# Patient Record
Sex: FEMALE | Race: WHITE | NOT HISPANIC OR LATINO | URBAN - METROPOLITAN AREA
[De-identification: names, ages, dates, MRNs, and addresses within clinical notes are randomized per-mention and may not be internally consistent; named-entity substitution may affect disease eponyms.]

---

## 2020-06-08 ENCOUNTER — OFFICE VISIT (OUTPATIENT)
Dept: URBAN - METROPOLITAN AREA CLINIC 113 | Facility: CLINIC | Age: 50
End: 2020-06-08

## 2020-07-25 ENCOUNTER — TELEPHONE ENCOUNTER (OUTPATIENT)
Dept: URBAN - METROPOLITAN AREA CLINIC 13 | Facility: CLINIC | Age: 50
End: 2020-07-25

## 2020-07-26 ENCOUNTER — TELEPHONE ENCOUNTER (OUTPATIENT)
Dept: URBAN - METROPOLITAN AREA CLINIC 13 | Facility: CLINIC | Age: 50
End: 2020-07-26

## 2020-07-26 RX ORDER — DESVENLAFAXINE SUCCINATE 50 MG/1
TAKE 1 TABLET DAILY TABLET, EXTENDED RELEASE ORAL
Refills: 0 | Status: ACTIVE | COMMUNITY
Start: 2018-10-24

## 2020-07-26 RX ORDER — METFORMIN HYDROCHLORIDE 500 MG/1
TAKE ONE TABLET TWICE DAILY WITH MEALS TABLET, COATED ORAL
Qty: 60 | Refills: 0 | Status: ACTIVE | COMMUNITY
Start: 2018-10-24

## 2020-07-26 RX ORDER — NORTRIPTYLINE HYDROCHLORIDE 25 MG/1
TAKE ONE CAPSULE BY MOUTH EVERY NIGHT AT BEDTIME CAPSULE ORAL
Qty: 30 | Refills: 0 | Status: ACTIVE | COMMUNITY
Start: 2018-05-09

## 2020-07-26 RX ORDER — METOPROLOL SUCCINATE 50 MG/1
TAKE 2 TABLETS DAILY TABLET, EXTENDED RELEASE ORAL
Refills: 0 | Status: ACTIVE | COMMUNITY
Start: 2018-05-07

## 2020-07-26 RX ORDER — VORTIOXETINE 10 MG/1
TAKE ONE TABLET DAILY TABLET, FILM COATED ORAL
Qty: 30 | Refills: 0 | Status: ACTIVE | COMMUNITY
Start: 2018-07-24

## 2020-07-26 RX ORDER — CLONAZEPAM 0.5 MG/1
TAKE ONE TABLET TWICE DAILY AS NEEDED TABLET ORAL
Qty: 60 | Refills: 0 | Status: ACTIVE | COMMUNITY
Start: 2018-10-25

## 2020-07-26 RX ORDER — DEXLANSOPRAZOLE 60 MG/1
TAKE ONE CAPSULE EVERY DAY CAPSULE, DELAYED RELEASE ORAL
Qty: 30 | Refills: 0 | Status: ACTIVE | COMMUNITY
Start: 2018-02-13

## 2020-07-26 RX ORDER — BUTALBITAL, ACETAMINOPHEN, AND CAFFEINE 50; 325; 40 MG/1; MG/1; MG/1
TAKE ONE TABLET TWICE DAILY FOR HEADACHE TABLET ORAL
Qty: 60 | Refills: 0 | Status: ACTIVE | COMMUNITY
Start: 2018-05-15

## 2020-07-26 RX ORDER — TRAZODONE HYDROCHLORIDE 150 MG/1
TAKE TWO TABLETS AT BEDTIME TABLET ORAL
Qty: 60 | Refills: 0 | Status: ACTIVE | COMMUNITY
Start: 2018-02-13

## 2020-07-26 RX ORDER — ALENDRONATE SODIUM 70 MG/1
TAKE ONE TABLET ONCE A WEEK TABLET ORAL
Qty: 4 | Refills: 0 | Status: ACTIVE | COMMUNITY
Start: 2018-09-11

## 2020-07-26 RX ORDER — VORTIOXETINE 20 MG/1
TAKE 1 TABLET DAILY TABLET, FILM COATED ORAL
Qty: 30 | Refills: 0 | Status: ACTIVE | COMMUNITY
Start: 2018-08-20

## 2020-07-26 RX ORDER — FUROSEMIDE 20 MG/1
TAKE 1 TABLET DAILY TABLET ORAL
Refills: 0 | Status: ACTIVE | COMMUNITY

## 2020-07-26 RX ORDER — AMOXICILLIN AND CLAVULANATE POTASSIUM 875; 125 MG/1; MG/1
TAKE ONE TABLET EVERY TWELVE HOURS TABLET, FILM COATED ORAL
Qty: 20 | Refills: 0 | Status: ACTIVE | COMMUNITY
Start: 2018-07-24

## 2020-07-26 RX ORDER — NEOMYCIN SULFATE, POLYMYXIN B SULFATE AND HYDROCORTISONE 10; 3.5; 1 MG/ML; MG/ML; [USP'U]/ML
INSTILL FOUR DROPS IN AFFECTED EAR(S) TWICE DAILY SUSPENSION/ DROPS AURICULAR (OTIC)
Qty: 10 | Refills: 0 | Status: ACTIVE | COMMUNITY
Start: 2018-10-24

## 2020-07-26 RX ORDER — FLUTICASONE FUROATE AND VILANTEROL TRIFENATATE 100; 25 UG/1; UG/1
USE 1 INHALATION ONCE DAILY POWDER RESPIRATORY (INHALATION)
Refills: 0 | Status: ACTIVE | COMMUNITY

## 2020-07-26 RX ORDER — VERAPAMIL HYDROCHLORIDE 120 MG/1
TAKE ONE TABLET EVERY DAY TABLET, FILM COATED, EXTENDED RELEASE ORAL
Qty: 30 | Refills: 0 | Status: ACTIVE | COMMUNITY
Start: 2017-09-15

## 2020-07-26 RX ORDER — MONTELUKAST SODIUM 10 MG/1
TAKE ONE TABLET DAILY IN THE EVENING TABLET, FILM COATED ORAL
Qty: 30 | Refills: 0 | Status: ACTIVE | COMMUNITY
Start: 2018-10-24

## 2020-07-26 RX ORDER — METFORMIN HYDROCHLORIDE 1000 MG/1
TAKE 1 TABLET BY MOUTH DAILY TABLET, EXTENDED RELEASE ORAL
Qty: 30 | Refills: 0 | Status: ACTIVE | COMMUNITY
Start: 2018-02-08

## 2020-07-30 ENCOUNTER — OFFICE VISIT (OUTPATIENT)
Dept: URBAN - METROPOLITAN AREA CLINIC 113 | Facility: CLINIC | Age: 50
End: 2020-07-30

## 2020-08-27 ENCOUNTER — OFFICE VISIT (OUTPATIENT)
Dept: URBAN - METROPOLITAN AREA CLINIC 113 | Facility: CLINIC | Age: 50
End: 2020-08-27

## 2020-10-06 ENCOUNTER — OFFICE VISIT (OUTPATIENT)
Dept: URBAN - METROPOLITAN AREA CLINIC 113 | Facility: CLINIC | Age: 50
End: 2020-10-06

## 2020-11-20 ENCOUNTER — OFFICE VISIT (OUTPATIENT)
Dept: URBAN - METROPOLITAN AREA CLINIC 113 | Facility: CLINIC | Age: 50
End: 2020-11-20
Payer: COMMERCIAL

## 2020-11-20 VITALS
SYSTOLIC BLOOD PRESSURE: 154 MMHG | WEIGHT: 218 LBS | DIASTOLIC BLOOD PRESSURE: 94 MMHG | TEMPERATURE: 98.1 F | RESPIRATION RATE: 18 BRPM | HEART RATE: 101 BPM | BODY MASS INDEX: 40.12 KG/M2 | HEIGHT: 62 IN

## 2020-11-20 DIAGNOSIS — R19.7 DIARRHEA OF PRESUMED INFECTIOUS ORIGIN: ICD-10-CM

## 2020-11-20 DIAGNOSIS — K62.5 RECTAL BLEEDING: ICD-10-CM

## 2020-11-20 DIAGNOSIS — K52.9 COLITIS: ICD-10-CM

## 2020-11-20 PROCEDURE — G8482 FLU IMMUNIZE ORDER/ADMIN: HCPCS | Performed by: INTERNAL MEDICINE

## 2020-11-20 PROCEDURE — G9710 PT PROV HOSP SRV MSMT PER: HCPCS | Performed by: INTERNAL MEDICINE

## 2020-11-20 PROCEDURE — 99213 OFFICE O/P EST LOW 20 MIN: CPT | Performed by: INTERNAL MEDICINE

## 2020-11-20 PROCEDURE — G9906 PT RECV TBCO CESS INTERV: HCPCS | Performed by: INTERNAL MEDICINE

## 2020-11-20 PROCEDURE — G9902 PT SCRN TBCO AND ID AS USER: HCPCS | Performed by: INTERNAL MEDICINE

## 2020-11-20 PROCEDURE — 4004F PT TOBACCO SCREEN RCVD TLK: CPT | Performed by: INTERNAL MEDICINE

## 2020-11-20 PROCEDURE — 1036F TOBACCO NON-USER: CPT | Performed by: INTERNAL MEDICINE

## 2020-11-20 PROCEDURE — G9903 PT SCRN TBCO ID AS NON USER: HCPCS | Performed by: INTERNAL MEDICINE

## 2020-11-20 RX ORDER — SODIUM, POTASSIUM,MAG SULFATES 17.5-3.13G
354 ML SOLUTION, RECONSTITUTED, ORAL ORAL ONCE
Qty: 354 ML | Refills: 0 | OUTPATIENT
Start: 2020-11-20

## 2020-11-20 RX ORDER — SEMAGLUTIDE 1.34 MG/ML
AS DIRECTED INJECTION, SOLUTION SUBCUTANEOUS
Status: ACTIVE | COMMUNITY

## 2020-11-20 RX ORDER — CARVEDILOL 25 MG/1
1 TABLET WITH FOOD TABLET, FILM COATED ORAL TWICE A DAY
Status: ACTIVE | COMMUNITY

## 2020-11-20 RX ORDER — BUTALBITAL, ACETAMINOPHEN, AND CAFFEINE 50; 325; 40 MG/1; MG/1; MG/1
TAKE ONE TABLET TWICE DAILY FOR HEADACHE TABLET ORAL
Qty: 60 | Refills: 0 | Status: DISCONTINUED | COMMUNITY
Start: 2018-05-15

## 2020-11-20 RX ORDER — TRAZODONE HYDROCHLORIDE 150 MG/1
TAKE TWO TABLETS AT BEDTIME TABLET ORAL
Qty: 60 | Refills: 0 | Status: ACTIVE | COMMUNITY
Start: 2018-02-13

## 2020-11-20 RX ORDER — NEOMYCIN SULFATE, POLYMYXIN B SULFATE AND HYDROCORTISONE 10; 3.5; 1 MG/ML; MG/ML; [USP'U]/ML
INSTILL FOUR DROPS IN AFFECTED EAR(S) TWICE DAILY SUSPENSION/ DROPS AURICULAR (OTIC)
Qty: 10 | Refills: 0 | Status: DISCONTINUED | COMMUNITY
Start: 2018-10-24

## 2020-11-20 RX ORDER — MONTELUKAST SODIUM 10 MG/1
TAKE ONE TABLET DAILY IN THE EVENING TABLET, FILM COATED ORAL
Qty: 30 | Refills: 0 | Status: ACTIVE | COMMUNITY
Start: 2018-10-24

## 2020-11-20 RX ORDER — FUROSEMIDE 20 MG/1
TAKE 1 TABLET DAILY TABLET ORAL
Refills: 0 | Status: ACTIVE | COMMUNITY

## 2020-11-20 RX ORDER — CLONAZEPAM 0.5 MG/1
TAKE ONE TABLET TWICE DAILY AS NEEDED TABLET ORAL
Qty: 60 | Refills: 0 | Status: ACTIVE | COMMUNITY
Start: 2018-10-25

## 2020-11-20 RX ORDER — METOPROLOL SUCCINATE 50 MG/1
TAKE 2 TABLETS DAILY TABLET, EXTENDED RELEASE ORAL
Refills: 0 | Status: DISCONTINUED | COMMUNITY
Start: 2018-05-07

## 2020-11-20 RX ORDER — VORTIOXETINE 10 MG/1
TAKE ONE TABLET DAILY TABLET, FILM COATED ORAL
Qty: 30 | Refills: 0 | Status: DISCONTINUED | COMMUNITY
Start: 2018-07-24

## 2020-11-20 RX ORDER — VERAPAMIL HYDROCHLORIDE 120 MG/1
TAKE ONE TABLET EVERY DAY TABLET, FILM COATED, EXTENDED RELEASE ORAL
Qty: 30 | Refills: 0 | Status: ACTIVE | COMMUNITY
Start: 2017-09-15

## 2020-11-20 RX ORDER — VORTIOXETINE 20 MG/1
TAKE 1 TABLET DAILY TABLET, FILM COATED ORAL
Qty: 30 | Refills: 0 | Status: DISCONTINUED | COMMUNITY
Start: 2018-08-20

## 2020-11-20 RX ORDER — METFORMIN HYDROCHLORIDE 1000 MG/1
TAKE 1 TABLET BY MOUTH DAILY TABLET, EXTENDED RELEASE ORAL
Qty: 30 | Refills: 0 | Status: DISCONTINUED | COMMUNITY
Start: 2018-02-08

## 2020-11-20 RX ORDER — AMOXICILLIN AND CLAVULANATE POTASSIUM 875; 125 MG/1; MG/1
TAKE ONE TABLET EVERY TWELVE HOURS TABLET, FILM COATED ORAL
Qty: 20 | Refills: 0 | Status: DISCONTINUED | COMMUNITY
Start: 2018-07-24

## 2020-11-20 RX ORDER — DEXLANSOPRAZOLE 60 MG/1
TAKE ONE CAPSULE EVERY DAY CAPSULE, DELAYED RELEASE ORAL
Qty: 30 | Refills: 0 | Status: ACTIVE | COMMUNITY
Start: 2018-02-13

## 2020-11-20 RX ORDER — NORTRIPTYLINE HYDROCHLORIDE 25 MG/1
TAKE ONE CAPSULE BY MOUTH EVERY NIGHT AT BEDTIME CAPSULE ORAL
Qty: 30 | Refills: 0 | Status: ACTIVE | COMMUNITY
Start: 2018-05-09

## 2020-11-20 RX ORDER — DESVENLAFAXINE SUCCINATE 50 MG/1
TAKE 1 TABLET DAILY TABLET, EXTENDED RELEASE ORAL
Refills: 0 | Status: ACTIVE | COMMUNITY
Start: 2018-10-24

## 2020-11-20 RX ORDER — METFORMIN HYDROCHLORIDE 500 MG/1
TAKE ONE TABLET TWICE DAILY WITH MEALS TABLET, COATED ORAL
Qty: 60 | Refills: 0 | Status: DISCONTINUED | COMMUNITY
Start: 2018-10-24

## 2020-11-20 RX ORDER — ALENDRONATE SODIUM 70 MG/1
TAKE ONE TABLET ONCE A WEEK TABLET ORAL
Qty: 4 | Refills: 0 | Status: ACTIVE | COMMUNITY
Start: 2018-09-11

## 2020-11-20 RX ORDER — FLUTICASONE FUROATE AND VILANTEROL TRIFENATATE 100; 25 UG/1; UG/1
USE 1 INHALATION ONCE DAILY POWDER RESPIRATORY (INHALATION)
Refills: 0 | Status: ACTIVE | COMMUNITY

## 2020-11-20 NOTE — HPI-TODAY'S VISIT:
51 y/o female presenting for f/u. She has a hx of elevated liver enzymes. She has had incrased abdominal pain on both sides of her abdomen. She has had a CT scan performed which showed colitis. She also has rectal bleeding and mucus. She also has intermittent diarrhea. She also has intermittent constipation as well. She does have a family hx of colon cancer as well. ==================== 10/10/2019 Ms. Duarte is a 49-year-old female with a history of diabetes, nausea, hyperlipidemia, sleep apnea, Sjogren's syndrome, hypothyroidism, blindness in the right, anxiety, depression, and liver enzyme elevation presenting for follow-up. She was initially seen 6/24/19 referred for liver enzyme elevation. She reported elevated liver enzymes for 1-2 years. She denied alcohol use. She reported chronic GERD for which she was taking Dexilant. She had breakthrough heartburn twice a week and reported a few month history of solid food dysphagia. She reported chronic right upper quadrant pain. She reported nausea associated with migraines. She reported chronic alternating bowel habits. She had a colonoscopy 2 years prior during which polyps were removed and reported an EGD 10 years prior. Labs ordered to assess for sources of chronic liver disease and she was scheduled for a CT to assess for sources of right upper quadrant pain and to assess liver parenchyma. She was scheduled for an EGD to assess breakthrough reflux and dysphagia. Daily fiber was recommended for chronic alternating bowel habits. EGD 8/13/19: No endoscopic esophageal abnormality to explain dysphagia status post biopsy, gastritis status post biopsy, normal examined duodenum. Stomach body and antral biopsies demonstrated gastric mucosa with reactive foveolar hyperplasia, stromal fibrosis and focal mixed inflammation. No H. pylori. Esophageal biopsies demonstrated squamous mucosa with no diagnostic abnormalities. CT scan was not performed due to respiratory problems associated with IV diet administration. Labs 7/9/19 ceruloplasmin 35. Hepatitis C antibody, hepatitis A antibody total, hepatitis B surface antibody, hepatitis B surface antigen nonreactive. Serum immunoglobulins: IgG 821, IgA 378, IgM 191, IgE 63. LKM1 negative. WALE, ASMA, WALE negative. Alpha-1 antitrypsin phenotype MM. CBC: WBC 7.3, hemoglobin 15.2, MCV 89.5, platelets 202. Iron 122, TIBC 351, ferritin of 132. She reports unchanged pain in the right and left sides of her abdomen. She tried taking daily fiber without a change in bowel habits. She has a bowel movement 2-5 times per day. Her stools are occasionally hard and are occasionally loose. She denies red blood per rectum or melena. She is compliant with Dexilant for acid reflux. She reports uncontrolled blood glucose levels stating that her blood glucose levels are "good if they are not higher than 250."

## 2020-11-24 ENCOUNTER — CLAIMS CREATED FROM THE CLAIM WINDOW (OUTPATIENT)
Dept: URBAN - METROPOLITAN AREA CLINIC 4 | Facility: CLINIC | Age: 50
End: 2020-11-24
Payer: COMMERCIAL

## 2020-11-24 ENCOUNTER — OFFICE VISIT (OUTPATIENT)
Dept: URBAN - METROPOLITAN AREA SURGERY CENTER 25 | Facility: SURGERY CENTER | Age: 50
End: 2020-11-24
Payer: COMMERCIAL

## 2020-11-24 DIAGNOSIS — K52.9 CHRONIC DIARRHEA: ICD-10-CM

## 2020-11-24 DIAGNOSIS — K63.89 OTHER SPECIFIED DISEASES OF INTESTINE: ICD-10-CM

## 2020-11-24 DIAGNOSIS — D12.4 ADENOMA OF DESCENDING COLON: ICD-10-CM

## 2020-11-24 DIAGNOSIS — D12.4 BENIGN NEOPLASM OF DESCENDING COLON: ICD-10-CM

## 2020-11-24 PROCEDURE — 45385 COLONOSCOPY W/LESION REMOVAL: CPT | Performed by: INTERNAL MEDICINE

## 2020-11-24 PROCEDURE — 88305 TISSUE EXAM BY PATHOLOGIST: CPT | Performed by: PATHOLOGY

## 2020-11-24 PROCEDURE — G8907 PT DOC NO EVENTS ON DISCHARG: HCPCS | Performed by: INTERNAL MEDICINE

## 2020-11-24 PROCEDURE — G9937 DIG OR SURV COLSCO: HCPCS | Performed by: INTERNAL MEDICINE

## 2020-11-24 PROCEDURE — 88342 IMHCHEM/IMCYTCHM 1ST ANTB: CPT | Performed by: PATHOLOGY

## 2020-11-24 PROCEDURE — 45380 COLONOSCOPY AND BIOPSY: CPT | Performed by: INTERNAL MEDICINE

## 2020-11-24 PROCEDURE — 88313 SPECIAL STAINS GROUP 2: CPT | Performed by: PATHOLOGY

## 2020-11-24 RX ORDER — DESVENLAFAXINE SUCCINATE 50 MG/1
TAKE 1 TABLET DAILY TABLET, EXTENDED RELEASE ORAL
Refills: 0 | Status: ACTIVE | COMMUNITY
Start: 2018-10-24

## 2020-11-24 RX ORDER — TRAZODONE HYDROCHLORIDE 150 MG/1
TAKE TWO TABLETS AT BEDTIME TABLET ORAL
Qty: 60 | Refills: 0 | Status: ACTIVE | COMMUNITY
Start: 2018-02-13

## 2020-11-24 RX ORDER — SEMAGLUTIDE 1.34 MG/ML
AS DIRECTED INJECTION, SOLUTION SUBCUTANEOUS
Status: ACTIVE | COMMUNITY

## 2020-11-24 RX ORDER — ALENDRONATE SODIUM 70 MG/1
TAKE ONE TABLET ONCE A WEEK TABLET ORAL
Qty: 4 | Refills: 0 | Status: ACTIVE | COMMUNITY
Start: 2018-09-11

## 2020-11-24 RX ORDER — FLUTICASONE FUROATE AND VILANTEROL TRIFENATATE 100; 25 UG/1; UG/1
USE 1 INHALATION ONCE DAILY POWDER RESPIRATORY (INHALATION)
Refills: 0 | Status: ACTIVE | COMMUNITY

## 2020-11-24 RX ORDER — SODIUM, POTASSIUM,MAG SULFATES 17.5-3.13G
354 ML SOLUTION, RECONSTITUTED, ORAL ORAL ONCE
Qty: 354 ML | Refills: 0 | Status: ACTIVE | COMMUNITY
Start: 2020-11-20

## 2020-11-24 RX ORDER — CARVEDILOL 25 MG/1
1 TABLET WITH FOOD TABLET, FILM COATED ORAL TWICE A DAY
Status: ACTIVE | COMMUNITY

## 2020-11-24 RX ORDER — DEXLANSOPRAZOLE 60 MG/1
TAKE ONE CAPSULE EVERY DAY CAPSULE, DELAYED RELEASE ORAL
Qty: 30 | Refills: 0 | Status: ACTIVE | COMMUNITY
Start: 2018-02-13

## 2020-11-24 RX ORDER — VERAPAMIL HYDROCHLORIDE 120 MG/1
TAKE ONE TABLET EVERY DAY TABLET, FILM COATED, EXTENDED RELEASE ORAL
Qty: 30 | Refills: 0 | Status: ACTIVE | COMMUNITY
Start: 2017-09-15

## 2020-11-24 RX ORDER — FUROSEMIDE 20 MG/1
TAKE 1 TABLET DAILY TABLET ORAL
Refills: 0 | Status: ACTIVE | COMMUNITY

## 2020-11-24 RX ORDER — CLONAZEPAM 0.5 MG/1
TAKE ONE TABLET TWICE DAILY AS NEEDED TABLET ORAL
Qty: 60 | Refills: 0 | Status: ACTIVE | COMMUNITY
Start: 2018-10-25

## 2020-11-24 RX ORDER — MONTELUKAST SODIUM 10 MG/1
TAKE ONE TABLET DAILY IN THE EVENING TABLET, FILM COATED ORAL
Qty: 30 | Refills: 0 | Status: ACTIVE | COMMUNITY
Start: 2018-10-24

## 2020-11-24 RX ORDER — NORTRIPTYLINE HYDROCHLORIDE 25 MG/1
TAKE ONE CAPSULE BY MOUTH EVERY NIGHT AT BEDTIME CAPSULE ORAL
Qty: 30 | Refills: 0 | Status: ACTIVE | COMMUNITY
Start: 2018-05-09

## 2020-12-29 ENCOUNTER — OFFICE VISIT (OUTPATIENT)
Dept: URBAN - METROPOLITAN AREA CLINIC 113 | Facility: CLINIC | Age: 50
End: 2020-12-29
Payer: COMMERCIAL

## 2020-12-29 ENCOUNTER — LAB OUTSIDE AN ENCOUNTER (OUTPATIENT)
Dept: URBAN - METROPOLITAN AREA CLINIC 113 | Facility: CLINIC | Age: 50
End: 2020-12-29

## 2020-12-29 VITALS
DIASTOLIC BLOOD PRESSURE: 86 MMHG | SYSTOLIC BLOOD PRESSURE: 161 MMHG | BODY MASS INDEX: 40.3 KG/M2 | RESPIRATION RATE: 18 BRPM | HEIGHT: 62 IN | TEMPERATURE: 97.8 F | WEIGHT: 219 LBS | HEART RATE: 97 BPM

## 2020-12-29 DIAGNOSIS — Z86.010 HISTORY OF ADENOMATOUS POLYP OF COLON: ICD-10-CM

## 2020-12-29 DIAGNOSIS — K21.9 GASTROESOPHAGEAL REFLUX DISEASE, UNSPECIFIED WHETHER ESOPHAGITIS PRESENT: ICD-10-CM

## 2020-12-29 DIAGNOSIS — K58.0 IRRITABLE BOWEL SYNDROME WITH DIARRHEA: ICD-10-CM

## 2020-12-29 DIAGNOSIS — K76.0 FATTY LIVER: ICD-10-CM

## 2020-12-29 PROCEDURE — G9903 PT SCRN TBCO ID AS NON USER: HCPCS | Performed by: INTERNAL MEDICINE

## 2020-12-29 PROCEDURE — 1036F TOBACCO NON-USER: CPT | Performed by: INTERNAL MEDICINE

## 2020-12-29 PROCEDURE — G8427 DOCREV CUR MEDS BY ELIG CLIN: HCPCS | Performed by: INTERNAL MEDICINE

## 2020-12-29 PROCEDURE — 3017F COLORECTAL CA SCREEN DOC REV: CPT | Performed by: INTERNAL MEDICINE

## 2020-12-29 PROCEDURE — 99213 OFFICE O/P EST LOW 20 MIN: CPT | Performed by: INTERNAL MEDICINE

## 2020-12-29 RX ORDER — MONTELUKAST SODIUM 10 MG/1
TAKE ONE TABLET DAILY IN THE EVENING TABLET, FILM COATED ORAL
Qty: 30 | Refills: 0 | Status: ACTIVE | COMMUNITY
Start: 2018-10-24

## 2020-12-29 RX ORDER — ALENDRONATE SODIUM 70 MG/1
TAKE ONE TABLET ONCE A WEEK TABLET ORAL
Qty: 4 | Refills: 0 | Status: ACTIVE | COMMUNITY
Start: 2018-09-11

## 2020-12-29 RX ORDER — ALBUTEROL SULFATE 108 UG/1
1 PUFF AS NEEDED AEROSOL, METERED RESPIRATORY (INHALATION)
Status: ACTIVE | COMMUNITY

## 2020-12-29 RX ORDER — CARVEDILOL 25 MG/1
1 TABLET WITH FOOD TABLET, FILM COATED ORAL TWICE A DAY
Status: ACTIVE | COMMUNITY

## 2020-12-29 RX ORDER — GLUCOSAMINE/CHONDR SU A SOD 750-600 MG
1 CAPSULE TABLET ORAL ONCE A DAY
Status: ACTIVE | COMMUNITY

## 2020-12-29 RX ORDER — FLUTICASONE FUROATE AND VILANTEROL TRIFENATATE 100; 25 UG/1; UG/1
USE 1 INHALATION ONCE DAILY POWDER RESPIRATORY (INHALATION)
Refills: 0 | Status: ACTIVE | COMMUNITY

## 2020-12-29 RX ORDER — INSULIN DEGLUDEC INJECTION 100 U/ML
AS DIRECTED INJECTION, SOLUTION SUBCUTANEOUS
Status: ACTIVE | COMMUNITY

## 2020-12-29 RX ORDER — TRAZODONE HYDROCHLORIDE 150 MG/1
TAKE TWO TABLETS AT BEDTIME TABLET ORAL
Qty: 60 | Refills: 0 | Status: ACTIVE | COMMUNITY
Start: 2018-02-13

## 2020-12-29 RX ORDER — DICYCLOMINE HYDROCHLORIDE 10 MG/1
1 TABLET CAPSULE ORAL
Qty: 60 | Refills: 3 | OUTPATIENT

## 2020-12-29 RX ORDER — LATANOPROST 50 UG/ML
1 DROP INTO AFFECTED EYE IN THE EVENING SOLUTION/ DROPS OPHTHALMIC ONCE A DAY
Status: ACTIVE | COMMUNITY

## 2020-12-29 RX ORDER — FUROSEMIDE 20 MG/1
1 TABLET TABLET ORAL ONCE A DAY
Status: ACTIVE | COMMUNITY

## 2020-12-29 RX ORDER — DEXLANSOPRAZOLE 60 MG/1
TAKE ONE CAPSULE EVERY DAY CAPSULE, DELAYED RELEASE ORAL
Qty: 30 | Refills: 0 | Status: ACTIVE | COMMUNITY
Start: 2018-02-13

## 2020-12-29 RX ORDER — NITROFURANTOIN MONOHYDRATE/MACROCRYSTALLINE 25; 75 MG/1; MG/1
1 CAPFUL CAPSULE ORAL TWICE A DAY
Status: ACTIVE | COMMUNITY

## 2020-12-29 RX ORDER — NORTRIPTYLINE HYDROCHLORIDE 25 MG/1
TAKE ONE CAPSULE BY MOUTH EVERY NIGHT AT BEDTIME CAPSULE ORAL
Qty: 30 | Refills: 0 | Status: ACTIVE | COMMUNITY
Start: 2018-05-09

## 2020-12-29 RX ORDER — MECLIZINE HCL 25MG 25 MG/1
1 TABLET AS NEEDED TABLET, CHEWABLE ORAL ONCE A DAY
Status: ACTIVE | COMMUNITY

## 2020-12-29 RX ORDER — CLONAZEPAM 0.5 MG/1
TAKE ONE TABLET TWICE DAILY AS NEEDED TABLET ORAL
Qty: 60 | Refills: 0 | Status: ACTIVE | COMMUNITY
Start: 2018-10-25

## 2020-12-29 RX ORDER — SODIUM, POTASSIUM,MAG SULFATES 17.5-3.13G
354 ML SOLUTION, RECONSTITUTED, ORAL ORAL ONCE
Qty: 354 MILLILITER | Refills: 0 | OUTPATIENT

## 2020-12-29 RX ORDER — COLESEVELAM HYDROCHLORIDE 625 MG/1
1 TABLET TABLET, COATED ORAL TWICE A DAY
Qty: 60 | Refills: 4 | OUTPATIENT

## 2020-12-29 RX ORDER — PNV NO.95/FERROUS FUM/FOLIC AC 28MG-0.8MG
1 TABLET TABLET ORAL ONCE A DAY
Status: ACTIVE | COMMUNITY

## 2020-12-29 RX ORDER — DAPAGLIFLOZIN 5 MG/1
1 TABLET TABLET, FILM COATED ORAL ONCE A DAY
Status: ACTIVE | COMMUNITY

## 2020-12-29 NOTE — HPI-OTHER HISTORIES
Colonoscopy 11/24/2020: BBPS 9, grade 2 nonbleeding internal hemorrhoids, removal of an 8 mm rectal sessile hyperplastic polyp, removal of a 12 mm descending sessile tubular adenoma, normal mucosa in the entire colon status post biopsy.  Random biopsies demonstrated no significant abnormality.  Surveillance recommended in 2023.  CT of the abdomen and pelvis without contrast 10/20/2020 : Mild bowel wall thickening of the ascending and transverse colon is suspicious for a mild acute colitis.  Approximate 2.6 x 2.2 cm cyst within the superior spleen with peripheral calcification likely in the setting of prior trauma and/or splenic cyst.    Labs 9/22/2020: BMP normal with the exception of glucose 298, chloride 99.  LFTs: TB 0.8, , ALT 69, AST 66.  Cholesterol 286, triglycerides 233, .  Vitamin B12 321.  Hemoglobin A1c 9.9.  Vitamin D 25 hydroxy 23.  Folic acid 17.  Free T3 3.97.  CBC: WBC 6.2, hemoglobin 15.3, MCV 92.1, platelet 185.  TSH 1.07.  Free T4 1.0.  WALE negative.  EGD 8/13/19: No endoscopic abnormality to explain dysphagia status post biopsy, gastritis status post biopsy, normal examined duodenum. Antral biopsies notable for gastric mucosa showing reactive foveolar hyperplasia, stromal fibrosis, and focal mixed inflammation which can be seen in tissue adjacent to an erosion.  No H. pylori organisms identified.  GE junction biopsies notable for squamous mucosa with mild reactive epithelial changes.  No glandular epithelium identified.  Labs 7/9/19 ceruloplasmin 35. Hepatitis C antibody, hepatitis A antibody total, hepatitis B surface antibody, hepatitis B surface antigen nonreactive. Serum immunoglobulins: IgG 821, IgA 378, IgM 191, IgE 63. LKM1 negative. WALE, ASMA, WALE negative. Alpha-1 antitrypsin phenotype MM. CBC: WBC 7.3, hemoglobin 15.2, MCV 89.5, platelets 202. Iron 122, TIBC 351, ferritin of 132.  Colonoscopy 5/11/15:good prep, removal of a 6 mm transverse tubular adenoma, removal of a 3 mm descending tubular adenoma, nonbleeding internal hemorrhoids. Random biopsies demonstrated unremarkable colonic mucosa.

## 2020-12-29 NOTE — HPI-TODAY'S VISIT:
This is a 49-year-old female with a history of diabetes, hyperlipidemia, sleep apnea, Sjogren's syndrome, hypothyroidism, blindness in the right eye, anxiety/depression, liver enzyme elevation, rectal bleeding, elevated liver enzymes related to fatty liver, diarrhea, and a CT scan notable for colitis presenting for follow-up after a diagnostic colonoscopy.  She was last seen 11/20/2020.  She was complaining of worsening abdominal pain, intermittent diarrhea and constipation, rectal bleeding, and had a recent CT scan that demonstrated colitis.  She was scheduled for a colonoscopy and a stool for C. difficile was ordered.  Colonscopy results below.  She reports persistent, multiple bowel movements per day.  Stool consistency ranges from solid to loose.  Stool frequency may be as often as 4 times a day.  She reports occasional urgent bowel movements and consistent urgency to defecate after eating at restaurants.  Her symptoms were worse on fiber.  She denies red blood per rectum or melena.  She has chronic pain indicating the right and left lateral abdomen occurring frequently.  Severity waxes and wanes.  Occasionally, this will improve after a bowel movement.  She reports some diet dependent exacerbations such as after eating spicy food.  Heartburn is controlled with Dexilant.  She has frequent, almost constant nausea that is worse with migraines.  She vomits once or twice a week producing bile.  She denies any other abdominal symptoms.  She has identified worsening diarrhea and abdominal symptoms associated with ingesting dairy products suggesting lactose intolerance.  She is on magnesium once a day for a history of magnesium deficiency.

## 2021-01-01 PROBLEM — 43240000: Status: RESOLVED | Noted: 2020-11-20 | Resolved: 2021-01-01

## 2021-04-06 ENCOUNTER — OFFICE VISIT (OUTPATIENT)
Dept: URBAN - METROPOLITAN AREA CLINIC 113 | Facility: CLINIC | Age: 51
End: 2021-04-06

## 2021-04-08 ENCOUNTER — OFFICE VISIT (OUTPATIENT)
Dept: URBAN - METROPOLITAN AREA CLINIC 113 | Facility: CLINIC | Age: 51
End: 2021-04-08

## 2021-06-03 ENCOUNTER — OFFICE VISIT (OUTPATIENT)
Dept: URBAN - METROPOLITAN AREA CLINIC 113 | Facility: CLINIC | Age: 51
End: 2021-06-03

## 2021-08-21 ENCOUNTER — ERX REFILL RESPONSE (OUTPATIENT)
Dept: URBAN - METROPOLITAN AREA CLINIC 113 | Facility: CLINIC | Age: 51
End: 2021-08-21

## 2021-08-21 RX ORDER — DICYCLOMINE HYDROCHLORIDE 10 MG/1
TAKE ONE CAPSULE BY MOUTH EVERY 6 HOURS AS NEEDED FOR ABDOMINAL PAIN CAPSULE ORAL
Qty: 60 CAPSULE | Refills: 3 | OUTPATIENT

## 2021-08-21 RX ORDER — DICYCLOMINE HYDROCHLORIDE 10 MG/1
1 TABLET CAPSULE ORAL
Qty: 60 | Refills: 3 | OUTPATIENT

## 2021-09-26 ENCOUNTER — ERX REFILL RESPONSE (OUTPATIENT)
Dept: URBAN - METROPOLITAN AREA CLINIC 113 | Facility: CLINIC | Age: 51
End: 2021-09-26

## 2021-09-26 RX ORDER — COLESEVELAM HYDROCHLORIDE 625 MG/1
1 TABLET TABLET, COATED ORAL TWICE A DAY
Qty: 60 | Refills: 4 | OUTPATIENT

## 2021-09-26 RX ORDER — COLESEVELAM HYDROCHLORIDE 625 MG/1
TAKE 1 TABLET BY MOUTH TWICE DAILY TABLET, FILM COATED ORAL
Qty: 60 TABLET | Refills: 4 | OUTPATIENT

## 2021-09-30 ENCOUNTER — OFFICE VISIT (OUTPATIENT)
Dept: URBAN - METROPOLITAN AREA CLINIC 113 | Facility: CLINIC | Age: 51
End: 2021-09-30

## 2021-12-15 ENCOUNTER — OFFICE VISIT (OUTPATIENT)
Dept: URBAN - METROPOLITAN AREA CLINIC 113 | Facility: CLINIC | Age: 51
End: 2021-12-15
Payer: COMMERCIAL

## 2021-12-15 ENCOUNTER — LAB OUTSIDE AN ENCOUNTER (OUTPATIENT)
Dept: URBAN - METROPOLITAN AREA CLINIC 113 | Facility: CLINIC | Age: 51
End: 2021-12-15

## 2021-12-15 ENCOUNTER — WEB ENCOUNTER (OUTPATIENT)
Dept: URBAN - METROPOLITAN AREA CLINIC 113 | Facility: CLINIC | Age: 51
End: 2021-12-15

## 2021-12-15 VITALS
WEIGHT: 209 LBS | BODY MASS INDEX: 38.46 KG/M2 | HEART RATE: 105 BPM | SYSTOLIC BLOOD PRESSURE: 152 MMHG | DIASTOLIC BLOOD PRESSURE: 87 MMHG | RESPIRATION RATE: 18 BRPM | HEIGHT: 62 IN | TEMPERATURE: 98.1 F

## 2021-12-15 DIAGNOSIS — R10.84 GENERALIZED ABDOMINAL PAIN: ICD-10-CM

## 2021-12-15 DIAGNOSIS — K76.0 FATTY LIVER: ICD-10-CM

## 2021-12-15 DIAGNOSIS — R79.89 ELEVATED LFTS: ICD-10-CM

## 2021-12-15 DIAGNOSIS — K58.2 IRRITABLE BOWEL SYNDROME WITH BOTH CONSTIPATION AND DIARRHEA: ICD-10-CM

## 2021-12-15 PROCEDURE — 99214 OFFICE O/P EST MOD 30 MIN: CPT | Performed by: INTERNAL MEDICINE

## 2021-12-15 RX ORDER — PNV NO.95/FERROUS FUM/FOLIC AC 28MG-0.8MG
1 TABLET TABLET ORAL ONCE A DAY
Status: ACTIVE | COMMUNITY

## 2021-12-15 RX ORDER — NORTRIPTYLINE HYDROCHLORIDE 25 MG/1
TAKE ONE CAPSULE BY MOUTH EVERY NIGHT AT BEDTIME CAPSULE ORAL
Qty: 30 | Refills: 0 | Status: ACTIVE | COMMUNITY
Start: 2018-05-09

## 2021-12-15 RX ORDER — COLESEVELAM HYDROCHLORIDE 625 MG/1
TAKE 1 TABLET BY MOUTH TWICE DAILY TABLET, FILM COATED ORAL
Qty: 60 TABLET | Refills: 4 | Status: ACTIVE | COMMUNITY

## 2021-12-15 RX ORDER — CLONAZEPAM 0.5 MG/1
TAKE ONE TABLET TWICE DAILY AS NEEDED TABLET ORAL
Qty: 60 | Refills: 0 | Status: ACTIVE | COMMUNITY
Start: 2018-10-25

## 2021-12-15 RX ORDER — SODIUM, POTASSIUM,MAG SULFATES 17.5-3.13G
354 ML SOLUTION, RECONSTITUTED, ORAL ORAL ONCE
Qty: 354 MILLILITER | Refills: 0 | Status: DISCONTINUED | COMMUNITY

## 2021-12-15 RX ORDER — MILNACIPRAN HYDROCHLORIDE 25 MG/1
2 TABLETS TABLET, FILM COATED ORAL TWICE A DAY
Status: ACTIVE | COMMUNITY

## 2021-12-15 RX ORDER — DICYCLOMINE HYDROCHLORIDE 10 MG/1
TAKE ONE CAPSULE BY MOUTH EVERY 6 HOURS AS NEEDED FOR ABDOMINAL PAIN CAPSULE ORAL
Qty: 60 CAPSULE | Refills: 3 | Status: ACTIVE | COMMUNITY

## 2021-12-15 RX ORDER — ALBUTEROL SULFATE 108 UG/1
1 PUFF AS NEEDED AEROSOL, METERED RESPIRATORY (INHALATION)
Status: ACTIVE | COMMUNITY

## 2021-12-15 RX ORDER — TRAZODONE HYDROCHLORIDE 150 MG/1
TAKE TWO TABLETS AT BEDTIME TABLET ORAL
Qty: 60 | Refills: 0 | Status: ACTIVE | COMMUNITY
Start: 2018-02-13

## 2021-12-15 RX ORDER — ALENDRONATE SODIUM 70 MG/1
TAKE ONE TABLET ONCE A WEEK TABLET ORAL
Qty: 4 | Refills: 0 | Status: ACTIVE | COMMUNITY
Start: 2018-09-11

## 2021-12-15 RX ORDER — DEXLANSOPRAZOLE 60 MG/1
TAKE ONE CAPSULE EVERY DAY CAPSULE, DELAYED RELEASE ORAL
Qty: 30 | Refills: 0 | Status: ACTIVE | COMMUNITY
Start: 2018-02-13

## 2021-12-15 RX ORDER — MONTELUKAST SODIUM 10 MG/1
TAKE ONE TABLET DAILY IN THE EVENING TABLET, FILM COATED ORAL
Qty: 30 | Refills: 0 | Status: ACTIVE | COMMUNITY
Start: 2018-10-24

## 2021-12-15 RX ORDER — MECLIZINE HCL 25MG 25 MG/1
1 TABLET AS NEEDED TABLET, CHEWABLE ORAL ONCE A DAY
Status: ACTIVE | COMMUNITY

## 2021-12-15 RX ORDER — RIMEGEPANT SULFATE 75 MG/75MG
1 TABLET ON THE TONGUE AND ALLOW TO DISSOLVE TABLET, ORALLY DISINTEGRATING ORAL
Status: ACTIVE | COMMUNITY

## 2021-12-15 RX ORDER — UBROGEPANT 100 MG/1
1 TABLET MAY TAKE SECOND DOSE AT LEAST 2 HOURS AFTER FIRST DOSE AS NEEDED TABLET ORAL ONCE A DAY
Status: ACTIVE | COMMUNITY

## 2021-12-15 RX ORDER — FLUTICASONE FUROATE AND VILANTEROL TRIFENATATE 100; 25 UG/1; UG/1
USE 1 INHALATION ONCE DAILY POWDER RESPIRATORY (INHALATION)
Refills: 0 | Status: DISCONTINUED | COMMUNITY

## 2021-12-15 RX ORDER — LATANOPROST 50 UG/ML
1 DROP INTO AFFECTED EYE IN THE EVENING SOLUTION/ DROPS OPHTHALMIC ONCE A DAY
Status: ACTIVE | COMMUNITY

## 2021-12-15 RX ORDER — INSULIN DEGLUDEC INJECTION 100 U/ML
AS DIRECTED INJECTION, SOLUTION SUBCUTANEOUS
Status: ACTIVE | COMMUNITY

## 2021-12-15 RX ORDER — DAPAGLIFLOZIN 5 MG/1
1 TABLET TABLET, FILM COATED ORAL ONCE A DAY
Status: ACTIVE | COMMUNITY

## 2021-12-15 RX ORDER — CARVEDILOL 25 MG/1
1 TABLET WITH FOOD TABLET, FILM COATED ORAL TWICE A DAY
Status: ACTIVE | COMMUNITY

## 2021-12-15 RX ORDER — FUROSEMIDE 20 MG/1
1 TABLET TABLET ORAL ONCE A DAY
Status: ACTIVE | COMMUNITY

## 2021-12-15 RX ORDER — VERAPAMIL HYDROCHLORIDE 120 MG/1
1 TABLET TABLET ORAL ONCE A DAY
Status: ACTIVE | COMMUNITY

## 2021-12-15 RX ORDER — ALBUTEROL SULFATE 108 UG/1
1 PUFF AS NEEDED AEROSOL, METERED RESPIRATORY (INHALATION)
Status: DISCONTINUED | COMMUNITY

## 2021-12-15 RX ORDER — GLUCOSAMINE/CHONDR SU A SOD 750-600 MG
1 CAPSULE TABLET ORAL ONCE A DAY
Status: ACTIVE | COMMUNITY

## 2021-12-15 RX ORDER — NITROFURANTOIN MONOHYDRATE/MACROCRYSTALLINE 25; 75 MG/1; MG/1
1 CAPFUL CAPSULE ORAL TWICE A DAY
Status: DISCONTINUED | COMMUNITY

## 2021-12-15 NOTE — HPI-OTHER HISTORIES
Colonoscopy 11/24/2020: BBPS 9, grade 2 nonbleeding internal hemorrhoids, removal of an 8 mm rectal sessile hyperplastic polyp, removal of a 12 mm descending sessile tubular adenoma, normal mucosa in the entire colon status post biopsy.  Random biopsies demonstrated no significant abnormality.  Surveillance recommended in 2023.  CT of the abdomen and pelvis without contrast 10/20/2020 : Mild bowel wall thickening of the ascending and transverse colon is suspicious for a mild acute colitis.  Approximate 2.6 x 2.2 cm cyst within the superior spleen with peripheral calcification likely in the setting of prior trauma and/or splenic cyst.    Labs 9/22/2020: BMP normal with the exception of glucose 298, chloride 99.  LFTs: TB 0.8, , ALT 69, AST 66.  Cholesterol 286, triglycerides 233, .  Vitamin B12 321.  Hemoglobin A1c 9.9.  Vitamin D 25 hydroxy 23.  Folic acid 17.  Free T3 3.97.  CBC: WBC 6.2, hemoglobin 15.3, MCV 92.1, platelet 185.  TSH 1.07.  Free T4 1.0.  WALE negative.  EGD 8/13/19: No endoscopic abnormality to explain dysphagia status post biopsy, gastritis status post biopsy, normal examined duodenum. Antral biopsies notable for gastric mucosa showing reactive foveolar hyperplasia, stromal fibrosis, and focal mixed inflammation which can be seen in tissue adjacent to an erosion.  No H. pylori organisms identified.  GE junction biopsies notable for squamous mucosa with mild reactive epithelial changes.  No glandular epithelium identified.  Labs 7/9/19 ceruloplasmin 35. Hepatitis C antibody, hepatitis A antibody total, hepatitis B surface antibody, hepatitis B surface antigen nonreactive. Serum immunoglobulins: IgG 821, IgA 378, IgM 191, IgE 63. LKM1 negative. WALE, ASMA, WALE negative. Alpha-1 antitrypsin phenotype MM. CBC: WBC 7.3, hemoglobin 15.2, MCV 89.5, platelets 202. Iron 122, TIBC 351, ferritin of 132.  Colonoscopy 5/11/15:good prep, removal of a 6 mm transverse tubular adenoma, removal of a 3 mm descending tubular adenoma, nonbleeding internal hemorrhoids. Random biopsies demonstrated unremarkable colonic mucosa.
NIYA (acute kidney injury)

## 2021-12-15 NOTE — HPI-TODAY'S VISIT:
51-year-old female with a history of diabetes, hyperlipidemia, sleep apnea, Sjogren's syndrome, hypothyroidism, blindness in the right eye, anxiety/depression, liver enzyme elevation, presenting for follow-up.  She was last seen in December 2020 after a diagnostic colonoscopy was done to evaluate for colities seen on CT without contrast. Colonoscopy was unremarakable, results outlined below.   She continues to have GI symptoms. A month ago, she started having new onset constipation. Stool softeners did not help. It lasted for 3 weeks. She is having a bowel movement daily, no melena or hematochezia. She  has bilateral lower abdominal pain for years, overall better but not resolved. It is sometimes better after a bowel movement. She had recent labs done by Dr. Eid.   ==================== 10/10/2019 Ms. Duarte is a 49-year-old female with a history of diabetes, nausea, hyperlipidemia, sleep apnea, Sjogren's syndrome, hypothyroidism, blindness in the right, anxiety, depression, and liver enzyme elevation presenting for follow-up. She was initially seen 6/24/19 referred for liver enzyme elevation. She reported elevated liver enzymes for 1-2 years. She denied alcohol use. She reported chronic GERD for which she was taking Dexilant. She had breakthrough heartburn twice a week and reported a few month history of solid food dysphagia. She reported chronic right upper quadrant pain. She reported nausea associated with migraines. She reported chronic alternating bowel habits. She had a colonoscopy 2 years prior during which polyps were removed and reported an EGD 10 years prior. Labs ordered to assess for sources of chronic liver disease and she was scheduled for a CT to assess for sources of right upper quadrant pain and to assess liver parenchyma. She was scheduled for an EGD to assess breakthrough reflux and dysphagia. Daily fiber was recommended for chronic alternating bowel habits. EGD 8/13/19: No endoscopic esophageal abnormality to explain dysphagia status post biopsy, gastritis status post biopsy, normal examined duodenum. Stomach body and antral biopsies demonstrated gastric mucosa with reactive foveolar hyperplasia, stromal fibrosis and focal mixed inflammation. No H. pylori. Esophageal biopsies demonstrated squamous mucosa with no diagnostic abnormalities. CT scan was not performed due to respiratory problems associated with IV diet administration. Labs 7/9/19 ceruloplasmin 35. Hepatitis C antibody, hepatitis A antibody total, hepatitis B surface antibody, hepatitis B surface antigen nonreactive. Serum immunoglobulins: IgG 821, IgA 378, IgM 191, IgE 63. LKM1 negative. WALE, ASMA, WALE negative. Alpha-1 antitrypsin phenotype MM. CBC: WBC 7.3, hemoglobin 15.2, MCV 89.5, platelets 202. Iron 122, TIBC 351, ferritin of 132. She reports unchanged pain in the right and left sides of her abdomen. She tried taking daily fiber without a change in bowel habits. She has a bowel movement 2-5 times per day. Her stools are occasionally hard and are occasionally loose. She denies red blood per rectum or melena. She is compliant with Dexilant for acid reflux. She reports uncontrolled blood glucose levels stating that her blood glucose levels are "good if they are not higher than 250."

## 2021-12-15 NOTE — PHYSICAL EXAM GASTROINTESTINAL
Abdomen , soft, tenderness diffusely to minimal palpation, nondistended , no guarding or rigidity , no masses palpable , normal bowel sounds , Liver and Spleen , no hepatomegaly present , no hepatosplenomegaly , liver nontender , spleen not palpable

## 2021-12-22 ENCOUNTER — TELEPHONE ENCOUNTER (OUTPATIENT)
Dept: URBAN - METROPOLITAN AREA CLINIC 113 | Facility: CLINIC | Age: 51
End: 2021-12-22

## 2021-12-29 ENCOUNTER — TELEPHONE ENCOUNTER (OUTPATIENT)
Dept: URBAN - METROPOLITAN AREA CLINIC 113 | Facility: CLINIC | Age: 51
End: 2021-12-29

## 2022-01-27 ENCOUNTER — TELEPHONE ENCOUNTER (OUTPATIENT)
Dept: URBAN - METROPOLITAN AREA CLINIC 113 | Facility: CLINIC | Age: 52
End: 2022-01-27

## 2022-03-15 ENCOUNTER — OFFICE VISIT (OUTPATIENT)
Dept: URBAN - METROPOLITAN AREA CLINIC 113 | Facility: CLINIC | Age: 52
End: 2022-03-15

## 2022-03-31 ENCOUNTER — OFFICE VISIT (OUTPATIENT)
Dept: URBAN - METROPOLITAN AREA CLINIC 113 | Facility: CLINIC | Age: 52
End: 2022-03-31

## 2022-04-19 ENCOUNTER — ERX REFILL RESPONSE (OUTPATIENT)
Dept: URBAN - METROPOLITAN AREA CLINIC 113 | Facility: CLINIC | Age: 52
End: 2022-04-19

## 2022-04-19 RX ORDER — DICYCLOMINE HYDROCHLORIDE 10 MG/1
TAKE ONE CAPSULE BY MOUTH EVERY 6 HOURS AS NEEDED FOR ABDOMINAL PAIN CAPSULE ORAL
Qty: 60 CAPSULE | Refills: 3 | OUTPATIENT

## 2022-04-19 RX ORDER — DICYCLOMINE HYDROCHLORIDE 10 MG/1
TAKE ONE CAPSULE BY MOUTH EVERY 6 HOURS AS NEEDED FOR ABDOMINAL PAIN CAPSULE ORAL
Qty: 60 CAPSULE | Refills: 4 | OUTPATIENT

## 2022-05-11 ENCOUNTER — OFFICE VISIT (OUTPATIENT)
Dept: URBAN - METROPOLITAN AREA CLINIC 113 | Facility: CLINIC | Age: 52
End: 2022-05-11
Payer: COMMERCIAL

## 2022-05-11 VITALS
RESPIRATION RATE: 18 BRPM | WEIGHT: 207 LBS | HEART RATE: 106 BPM | DIASTOLIC BLOOD PRESSURE: 83 MMHG | BODY MASS INDEX: 38.09 KG/M2 | TEMPERATURE: 97.5 F | SYSTOLIC BLOOD PRESSURE: 149 MMHG | HEIGHT: 62 IN

## 2022-05-11 DIAGNOSIS — Z86.010 HISTORY OF ADENOMATOUS POLYP OF COLON: ICD-10-CM

## 2022-05-11 DIAGNOSIS — R79.89 ELEVATED LFTS: ICD-10-CM

## 2022-05-11 DIAGNOSIS — K58.0 IRRITABLE BOWEL SYNDROME WITH DIARRHEA: ICD-10-CM

## 2022-05-11 DIAGNOSIS — K58.2 IRRITABLE BOWEL SYNDROME WITH BOTH CONSTIPATION AND DIARRHEA: ICD-10-CM

## 2022-05-11 DIAGNOSIS — R74.8 ELEVATED LIVER ENZYMES: ICD-10-CM

## 2022-05-11 DIAGNOSIS — K76.0 FATTY LIVER: ICD-10-CM

## 2022-05-11 DIAGNOSIS — R10.11 RUQ ABDOMINAL PAIN: ICD-10-CM

## 2022-05-11 DIAGNOSIS — R10.84 GENERALIZED ABDOMINAL PAIN: ICD-10-CM

## 2022-05-11 DIAGNOSIS — K21.9 GASTROESOPHAGEAL REFLUX DISEASE, UNSPECIFIED WHETHER ESOPHAGITIS PRESENT: ICD-10-CM

## 2022-05-11 PROCEDURE — 99214 OFFICE O/P EST MOD 30 MIN: CPT | Performed by: INTERNAL MEDICINE

## 2022-05-11 RX ORDER — MECLIZINE HCL 25MG 25 MG/1
1 TABLET AS NEEDED TABLET, CHEWABLE ORAL ONCE A DAY
Status: ACTIVE | COMMUNITY

## 2022-05-11 RX ORDER — PNV NO.95/FERROUS FUM/FOLIC AC 28MG-0.8MG
1 TABLET TABLET ORAL ONCE A DAY
Status: ACTIVE | COMMUNITY

## 2022-05-11 RX ORDER — LATANOPROST 50 UG/ML
1 DROP INTO AFFECTED EYE IN THE EVENING SOLUTION/ DROPS OPHTHALMIC ONCE A DAY
Status: ACTIVE | COMMUNITY

## 2022-05-11 RX ORDER — FUROSEMIDE 20 MG/1
1 TABLET TABLET ORAL ONCE A DAY
Status: ACTIVE | COMMUNITY

## 2022-05-11 RX ORDER — NORTRIPTYLINE HYDROCHLORIDE 25 MG/1
TAKE ONE CAPSULE BY MOUTH EVERY NIGHT AT BEDTIME CAPSULE ORAL
Qty: 30 | Refills: 0 | Status: ACTIVE | COMMUNITY
Start: 2018-05-09

## 2022-05-11 RX ORDER — DEXLANSOPRAZOLE 60 MG/1
TAKE ONE CAPSULE EVERY DAY CAPSULE, DELAYED RELEASE ORAL
Qty: 30 | Refills: 0 | Status: ACTIVE | COMMUNITY
Start: 2018-02-13

## 2022-05-11 RX ORDER — DAPAGLIFLOZIN 5 MG/1
1 TABLET TABLET, FILM COATED ORAL ONCE A DAY
Status: ACTIVE | COMMUNITY

## 2022-05-11 RX ORDER — MONTELUKAST SODIUM 10 MG/1
TAKE ONE TABLET DAILY IN THE EVENING TABLET, FILM COATED ORAL
Qty: 30 | Refills: 0 | Status: ACTIVE | COMMUNITY
Start: 2018-10-24

## 2022-05-11 RX ORDER — CLONAZEPAM 0.5 MG/1
TAKE ONE TABLET TWICE DAILY AS NEEDED TABLET ORAL
Qty: 60 | Refills: 0 | Status: ACTIVE | COMMUNITY
Start: 2018-10-25

## 2022-05-11 RX ORDER — MILNACIPRAN HYDROCHLORIDE 25 MG/1
2 TABLETS TABLET, FILM COATED ORAL TWICE A DAY
Status: ACTIVE | COMMUNITY

## 2022-05-11 RX ORDER — DICYCLOMINE HYDROCHLORIDE 10 MG/1
TAKE ONE CAPSULE BY MOUTH EVERY 6 HOURS AS NEEDED FOR ABDOMINAL PAIN CAPSULE ORAL
Qty: 60 CAPSULE | Refills: 4 | Status: ACTIVE | COMMUNITY

## 2022-05-11 RX ORDER — COLESEVELAM HYDROCHLORIDE 625 MG/1
TAKE 1 TABLET BY MOUTH TWICE DAILY TABLET, FILM COATED ORAL
Qty: 60 TABLET | Refills: 4 | Status: ACTIVE | COMMUNITY

## 2022-05-11 RX ORDER — CARVEDILOL 25 MG/1
1 TABLET WITH FOOD TABLET, FILM COATED ORAL TWICE A DAY
Status: ACTIVE | COMMUNITY

## 2022-05-11 RX ORDER — BUSPIRONE HYDROCHLORIDE 5 MG/1
1 TABLET TABLET ORAL TWICE A DAY
Qty: 180 TABLET | Refills: 2 | OUTPATIENT
Start: 2022-05-11

## 2022-05-11 RX ORDER — RIMEGEPANT SULFATE 75 MG/75MG
1 TABLET ON THE TONGUE AND ALLOW TO DISSOLVE TABLET, ORALLY DISINTEGRATING ORAL
Status: ACTIVE | COMMUNITY

## 2022-05-11 RX ORDER — TRAZODONE HYDROCHLORIDE 150 MG/1
TAKE TWO TABLETS AT BEDTIME TABLET ORAL
Qty: 60 | Refills: 0 | Status: ACTIVE | COMMUNITY
Start: 2018-02-13

## 2022-05-11 RX ORDER — VERAPAMIL HYDROCHLORIDE 120 MG/1
1 TABLET TABLET ORAL ONCE A DAY
Status: ACTIVE | COMMUNITY

## 2022-05-11 RX ORDER — UBROGEPANT 100 MG/1
1 TABLET MAY TAKE SECOND DOSE AT LEAST 2 HOURS AFTER FIRST DOSE AS NEEDED TABLET ORAL ONCE A DAY
Status: ACTIVE | COMMUNITY

## 2022-05-11 RX ORDER — ALBUTEROL SULFATE 108 UG/1
1 PUFF AS NEEDED AEROSOL, METERED RESPIRATORY (INHALATION)
Status: ACTIVE | COMMUNITY

## 2022-05-11 RX ORDER — GLUCOSAMINE/CHONDR SU A SOD 750-600 MG
1 CAPSULE TABLET ORAL ONCE A DAY
Status: ACTIVE | COMMUNITY

## 2022-05-11 RX ORDER — INSULIN DEGLUDEC INJECTION 100 U/ML
AS DIRECTED INJECTION, SOLUTION SUBCUTANEOUS
Status: ACTIVE | COMMUNITY

## 2022-05-11 RX ORDER — ALENDRONATE SODIUM 70 MG/1
TAKE ONE TABLET ONCE A WEEK TABLET ORAL
Qty: 4 | Refills: 0 | Status: ACTIVE | COMMUNITY
Start: 2018-09-11

## 2022-05-11 NOTE — HPI-OTHER HISTORIES
Colonoscopy 11/24/2020: BBPS 9, grade 2 nonbleeding internal hemorrhoids, removal of an 8 mm rectal sessile hyperplastic polyp, removal of a 12 mm descending sessile tubular adenoma, normal mucosa in the entire colon status post biopsy.  Random biopsies demonstrated no significant abnormality.  Surveillance recommended in 2023.  CT of the abdomen and pelvis without contrast 10/20/2020 : Mild bowel wall thickening of the ascending and transverse colon is suspicious for a mild acute colitis.  Approximate 2.6 x 2.2 cm cyst within the superior spleen with peripheral calcification likely in the setting of prior trauma and/or splenic cyst.    Labs 9/22/2020: BMP normal with the exception of glucose 298, chloride 99.  LFTs: TB 0.8, , ALT 69, AST 66.  Cholesterol 286, triglycerides 233, .  Vitamin B12 321.  Hemoglobin A1c 9.9.  Vitamin D 25 hydroxy 23.  Folic acid 17.  Free T3 3.97.  CBC: WBC 6.2, hemoglobin 15.3, MCV 92.1, platelet 185.  TSH 1.07.  Free T4 1.0.  WALE negative.  EGD 8/13/19: No endoscopic abnormality to explain dysphagia status post biopsy, gastritis status post biopsy, normal examined duodenum. Antral biopsies notable for gastric mucosa showing reactive foveolar hyperplasia, stromal fibrosis, and focal mixed inflammation which can be seen in tissue adjacent to an erosion.  No H. pylori organisms identified.  GE junction biopsies notable for squamous mucosa with mild reactive epithelial changes.  No glandular epithelium identified.  Labs 7/9/19 ceruloplasmin 35. Hepatitis C antibody, hepatitis A antibody total, hepatitis B surface antibody, hepatitis B surface antigen nonreactive. Serum immunoglobulins: IgG 821, IgA 378, IgM 191, IgE 63. LKM1 negative. WALE, ASMA, WALE negative. Alpha-1 antitrypsin phenotype MM. CBC: WBC 7.3, hemoglobin 15.2, MCV 89.5, platelets 202. Iron 122, TIBC 351, ferritin of 132.  Colonoscopy 5/11/15:good prep, removal of a 6 mm transverse tubular adenoma, removal of a 3 mm descending tubular adenoma, nonbleeding internal hemorrhoids. Random biopsies demonstrated unremarkable colonic mucosa. Colonoscopy 11/24/2020: BBPS 9, grade 2 nonbleeding internal hemorrhoids, removal of an 8 mm rectal sessile hyperplastic polyp, removal of a 12 mm descending sessile tubular adenoma, normal mucosa in the entire colon status post biopsy.  Random biopsies demonstrated no significant abnormality.  Surveillance recommended in 2023.  CT of the abdomen and pelvis without contrast 10/20/2020 : Mild bowel wall thickening of the ascending and transverse colon is suspicious for a mild acute colitis.  Approximate 2.6 x 2.2 cm cyst within the superior spleen with peripheral calcification likely in the setting of prior trauma and/or splenic cyst.    Labs 9/22/2020: BMP normal with the exception of glucose 298, chloride 99.  LFTs: TB 0.8, , ALT 69, AST 66.  Cholesterol 286, triglycerides 233, .  Vitamin B12 321.  Hemoglobin A1c 9.9.  Vitamin D 25 hydroxy 23.  Folic acid 17.  Free T3 3.97.  CBC: WBC 6.2, hemoglobin 15.3, MCV 92.1, platelet 185.  TSH 1.07.  Free T4 1.0.  WALE negative.  EGD 8/13/19: No endoscopic abnormality to explain dysphagia status post biopsy, gastritis status post biopsy, normal examined duodenum. Antral biopsies notable for gastric mucosa showing reactive foveolar hyperplasia, stromal fibrosis, and focal mixed inflammation which can be seen in tissue adjacent to an erosion.  No H. pylori organisms identified.  GE junction biopsies notable for squamous mucosa with mild reactive epithelial changes.  No glandular epithelium identified.  Labs 7/9/19 ceruloplasmin 35. Hepatitis C antibody, hepatitis A antibody total, hepatitis B surface antibody, hepatitis B surface antigen nonreactive. Serum immunoglobulins: IgG 821, IgA 378, IgM 191, IgE 63. LKM1 negative. WALE, ASMA, WALE negative. Alpha-1 antitrypsin phenotype MM. CBC: WBC 7.3, hemoglobin 15.2, MCV 89.5, platelets 202. Iron 122, TIBC 351, ferritin of 132.  Colonoscopy 5/11/15:good prep, removal of a 6 mm transverse tubular adenoma, removal of a 3 mm descending tubular adenoma, nonbleeding internal hemorrhoids. Random biopsies demonstrated unremarkable colonic mucosa.

## 2022-05-11 NOTE — HPI-TODAY'S VISIT:
52-year-old female presenting for follow-up.  She was last seen in clinic in December 2021. We did try to get a CT scan last year but it was not approved. She has abdominal pain (generalized). She does have some constipation. Her pain is mostly RUQ/R flank. She has had increased abdominal pain when she is stressed.   12/2021 51-year-old female with a history of diabetes, hyperlipidemia, sleep apnea, Sjogren's syndrome, hypothyroidism, blindness in the right eye, anxiety/depression, liver enzyme elevation, presenting for follow-up.  She was last seen in December 2020 after a diagnostic colonoscopy was done to evaluate for colities seen on CT without contrast. Colonoscopy was unremarakable, results outlined below.   She continues to have GI symptoms. A month ago, she started having new onset constipation. Stool softeners did not help. It lasted for 3 weeks. She is having a bowel movement daily, no melena or hematochezia. She  has bilateral lower abdominal pain for years, overall better but not resolved. It is sometimes better after a bowel movement. She had recent labs done by Dr. Eid.   ==================== 10/10/2019 Ms. Duarte is a 49-year-old female with a history of diabetes, nausea, hyperlipidemia, sleep apnea, Sjogren's syndrome, hypothyroidism, blindness in the right, anxiety, depression, and liver enzyme elevation presenting for follow-up. She was initially seen 6/24/19 referred for liver enzyme elevation. She reported elevated liver enzymes for 1-2 years. She denied alcohol use. She reported chronic GERD for which she was taking Dexilant. She had breakthrough heartburn twice a week and reported a few month history of solid food dysphagia. She reported chronic right upper quadrant pain. She reported nausea associated with migraines. She reported chronic alternating bowel habits. She had a colonoscopy 2 years prior during which polyps were removed and reported an EGD 10 years prior. Labs ordered to assess for sources of chronic liver disease and she was scheduled for a CT to assess for sources of right upper quadrant pain and to assess liver parenchyma. She was scheduled for an EGD to assess breakthrough reflux and dysphagia. Daily fiber was recommended for chronic alternating bowel habits. EGD 8/13/19: No endoscopic esophageal abnormality to explain dysphagia status post biopsy, gastritis status post biopsy, normal examined duodenum. Stomach body and antral biopsies demonstrated gastric mucosa with reactive foveolar hyperplasia, stromal fibrosis and focal mixed inflammation. No H. pylori. Esophageal biopsies demonstrated squamous mucosa with no diagnostic abnormalities. CT scan was not performed due to respiratory problems associated with IV diet administration. Labs 7/9/19 ceruloplasmin 35. Hepatitis C antibody, hepatitis A antibody total, hepatitis B surface antibody, hepatitis B surface antigen nonreactive. Serum immunoglobulins: IgG 821, IgA 378, IgM 191, IgE 63. LKM1 negative. WALE, ASMA, WALE negative. Alpha-1 antitrypsin phenotype MM. CBC: WBC 7.3, hemoglobin 15.2, MCV 89.5, platelets 202. Iron 122, TIBC 351, ferritin of 132. She reports unchanged pain in the right and left sides of her abdomen. She tried taking daily fiber without a change in bowel habits. She has a bowel movement 2-5 times per day. Her stools are occasionally hard and are occasionally loose. She denies red blood per rectum or melena. She is compliant with Dexilant for acid reflux. She reports uncontrolled blood glucose levels stating that her blood glucose levels are "good if they are not higher than 250." This is a 49-year-old female with a history of diabetes, hyperlipidemia, sleep apnea, Sjogren's syndrome, hypothyroidism, blindness in the right eye, anxiety/depression, liver enzyme elevation, rectal bleeding, elevated liver enzymes related to fatty liver, diarrhea, and a CT scan notable for colitis presenting for follow-up after a diagnostic colonoscopy.  She was last seen 11/20/2020.  She was complaining of worsening abdominal pain, intermittent diarrhea and constipation, rectal bleeding, and had a recent CT scan that demonstrated colitis.  She was scheduled for a colonoscopy and a stool for C. difficile was ordered.  Colonscopy results below.  She reports persistent, multiple bowel movements per day.  Stool consistency ranges from solid to loose.  Stool frequency may be as often as 4 times a day.  She reports occasional urgent bowel movements and consistent urgency to defecate after eating at restaurants.  Her symptoms were worse on fiber.  She denies red blood per rectum or melena.  She has chronic pain indicating the right and left lateral abdomen occurring frequently.  Severity waxes and wanes.  Occasionally, this will improve after a bowel movement.  She reports some diet dependent exacerbations such as after eating spicy food.  Heartburn is controlled with Dexilant.  She has frequent, almost constant nausea that is worse with migraines.  She vomits once or twice a week producing bile.  She denies any other abdominal symptoms.  She has identified worsening diarrhea and abdominal symptoms associated with ingesting dairy products suggesting lactose intolerance.  She is on magnesium once a day for a history of magnesium deficiency.

## 2022-05-23 ENCOUNTER — TELEPHONE ENCOUNTER (OUTPATIENT)
Dept: URBAN - METROPOLITAN AREA CLINIC 113 | Facility: CLINIC | Age: 52
End: 2022-05-23

## 2022-06-14 ENCOUNTER — TELEPHONE ENCOUNTER (OUTPATIENT)
Dept: URBAN - METROPOLITAN AREA CLINIC 113 | Facility: CLINIC | Age: 52
End: 2022-06-14

## 2022-06-24 PROBLEM — 64226004: Status: RESOLVED | Noted: 2020-11-20 | Resolved: 2020-12-29

## 2022-06-24 PROBLEM — 12063002: Status: ACTIVE | Noted: 2020-11-20

## 2022-07-05 ENCOUNTER — TELEPHONE ENCOUNTER (OUTPATIENT)
Dept: URBAN - METROPOLITAN AREA CLINIC 113 | Facility: CLINIC | Age: 52
End: 2022-07-05

## 2022-07-06 ENCOUNTER — TELEPHONE ENCOUNTER (OUTPATIENT)
Dept: URBAN - METROPOLITAN AREA CLINIC 113 | Facility: CLINIC | Age: 52
End: 2022-07-06

## 2022-07-06 ENCOUNTER — OFFICE VISIT (OUTPATIENT)
Dept: URBAN - METROPOLITAN AREA SURGERY CENTER 25 | Facility: SURGERY CENTER | Age: 52
End: 2022-07-06

## 2022-07-07 ENCOUNTER — OFFICE VISIT (OUTPATIENT)
Dept: URBAN - METROPOLITAN AREA CLINIC 113 | Facility: CLINIC | Age: 52
End: 2022-07-07

## 2022-07-28 ENCOUNTER — OFFICE VISIT (OUTPATIENT)
Dept: URBAN - METROPOLITAN AREA CLINIC 113 | Facility: CLINIC | Age: 52
End: 2022-07-28

## 2022-08-04 ENCOUNTER — CLAIMS CREATED FROM THE CLAIM WINDOW (OUTPATIENT)
Dept: URBAN - METROPOLITAN AREA CLINIC 4 | Facility: CLINIC | Age: 52
End: 2022-08-04
Payer: COMMERCIAL

## 2022-08-04 ENCOUNTER — OFFICE VISIT (OUTPATIENT)
Dept: URBAN - METROPOLITAN AREA SURGERY CENTER 25 | Facility: SURGERY CENTER | Age: 52
End: 2022-08-04
Payer: COMMERCIAL

## 2022-08-04 DIAGNOSIS — K31.89 OTHER DISEASES OF STOMACH AND DUODENUM: ICD-10-CM

## 2022-08-04 DIAGNOSIS — K31.89 GASTRIC FOVEOLAR HYPERPLASIA: ICD-10-CM

## 2022-08-04 DIAGNOSIS — R10.13 ABDOMINAL PAIN, EPIGASTRIC: ICD-10-CM

## 2022-08-04 PROCEDURE — 88305 TISSUE EXAM BY PATHOLOGIST: CPT | Performed by: PATHOLOGY

## 2022-08-04 PROCEDURE — 43239 EGD BIOPSY SINGLE/MULTIPLE: CPT | Performed by: INTERNAL MEDICINE

## 2022-08-04 PROCEDURE — 88312 SPECIAL STAINS GROUP 1: CPT | Performed by: PATHOLOGY

## 2022-08-04 PROCEDURE — G8907 PT DOC NO EVENTS ON DISCHARG: HCPCS | Performed by: INTERNAL MEDICINE

## 2022-08-04 RX ORDER — MONTELUKAST SODIUM 10 MG/1
TAKE ONE TABLET DAILY IN THE EVENING TABLET, FILM COATED ORAL
Qty: 30 | Refills: 0 | Status: ACTIVE | COMMUNITY
Start: 2018-10-24

## 2022-08-04 RX ORDER — TRAZODONE HYDROCHLORIDE 150 MG/1
TAKE TWO TABLETS AT BEDTIME TABLET ORAL
Qty: 60 | Refills: 0 | Status: ACTIVE | COMMUNITY
Start: 2018-02-13

## 2022-08-04 RX ORDER — LATANOPROST 50 UG/ML
1 DROP INTO AFFECTED EYE IN THE EVENING SOLUTION/ DROPS OPHTHALMIC ONCE A DAY
Status: ACTIVE | COMMUNITY

## 2022-08-04 RX ORDER — CLONAZEPAM 0.5 MG/1
TAKE ONE TABLET TWICE DAILY AS NEEDED TABLET ORAL
Qty: 60 | Refills: 0 | Status: ACTIVE | COMMUNITY
Start: 2018-10-25

## 2022-08-04 RX ORDER — FUROSEMIDE 20 MG/1
1 TABLET TABLET ORAL ONCE A DAY
Status: ACTIVE | COMMUNITY

## 2022-08-04 RX ORDER — UBROGEPANT 100 MG/1
1 TABLET MAY TAKE SECOND DOSE AT LEAST 2 HOURS AFTER FIRST DOSE AS NEEDED TABLET ORAL ONCE A DAY
Status: ACTIVE | COMMUNITY

## 2022-08-04 RX ORDER — VERAPAMIL HYDROCHLORIDE 120 MG/1
1 TABLET TABLET ORAL ONCE A DAY
Status: ACTIVE | COMMUNITY

## 2022-08-04 RX ORDER — INSULIN DEGLUDEC INJECTION 100 U/ML
AS DIRECTED INJECTION, SOLUTION SUBCUTANEOUS
Status: ACTIVE | COMMUNITY

## 2022-08-04 RX ORDER — NORTRIPTYLINE HYDROCHLORIDE 25 MG/1
TAKE ONE CAPSULE BY MOUTH EVERY NIGHT AT BEDTIME CAPSULE ORAL
Qty: 30 | Refills: 0 | Status: ACTIVE | COMMUNITY
Start: 2018-05-09

## 2022-08-04 RX ORDER — ALBUTEROL SULFATE 108 UG/1
1 PUFF AS NEEDED AEROSOL, METERED RESPIRATORY (INHALATION)
Status: ACTIVE | COMMUNITY

## 2022-08-04 RX ORDER — PNV NO.95/FERROUS FUM/FOLIC AC 28MG-0.8MG
1 TABLET TABLET ORAL ONCE A DAY
Status: ACTIVE | COMMUNITY

## 2022-08-04 RX ORDER — COLESEVELAM HYDROCHLORIDE 625 MG/1
TAKE 1 TABLET BY MOUTH TWICE DAILY TABLET, FILM COATED ORAL
Qty: 60 TABLET | Refills: 4 | Status: ACTIVE | COMMUNITY

## 2022-08-04 RX ORDER — DAPAGLIFLOZIN 5 MG/1
1 TABLET TABLET, FILM COATED ORAL ONCE A DAY
Status: ACTIVE | COMMUNITY

## 2022-08-04 RX ORDER — RIMEGEPANT SULFATE 75 MG/75MG
1 TABLET ON THE TONGUE AND ALLOW TO DISSOLVE TABLET, ORALLY DISINTEGRATING ORAL
Status: ACTIVE | COMMUNITY

## 2022-08-04 RX ORDER — GLUCOSAMINE/CHONDR SU A SOD 750-600 MG
1 CAPSULE TABLET ORAL ONCE A DAY
Status: ACTIVE | COMMUNITY

## 2022-08-04 RX ORDER — CARVEDILOL 25 MG/1
1 TABLET WITH FOOD TABLET, FILM COATED ORAL TWICE A DAY
Status: ACTIVE | COMMUNITY

## 2022-08-04 RX ORDER — MECLIZINE HCL 25MG 25 MG/1
1 TABLET AS NEEDED TABLET, CHEWABLE ORAL ONCE A DAY
Status: ACTIVE | COMMUNITY

## 2022-08-04 RX ORDER — BUSPIRONE HYDROCHLORIDE 5 MG/1
1 TABLET TABLET ORAL TWICE A DAY
Qty: 180 TABLET | Refills: 2 | Status: ACTIVE | COMMUNITY
Start: 2022-05-11

## 2022-08-04 RX ORDER — DEXLANSOPRAZOLE 60 MG/1
TAKE ONE CAPSULE EVERY DAY CAPSULE, DELAYED RELEASE ORAL
Qty: 30 | Refills: 0 | Status: ACTIVE | COMMUNITY
Start: 2018-02-13

## 2022-08-04 RX ORDER — ALENDRONATE SODIUM 70 MG/1
TAKE ONE TABLET ONCE A WEEK TABLET ORAL
Qty: 4 | Refills: 0 | Status: ACTIVE | COMMUNITY
Start: 2018-09-11

## 2022-08-04 RX ORDER — MILNACIPRAN HYDROCHLORIDE 25 MG/1
2 TABLETS TABLET, FILM COATED ORAL TWICE A DAY
Status: ACTIVE | COMMUNITY

## 2022-08-04 RX ORDER — DICYCLOMINE HYDROCHLORIDE 10 MG/1
TAKE ONE CAPSULE BY MOUTH EVERY 6 HOURS AS NEEDED FOR ABDOMINAL PAIN CAPSULE ORAL
Qty: 60 CAPSULE | Refills: 4 | Status: ACTIVE | COMMUNITY

## 2022-08-16 ENCOUNTER — OFFICE VISIT (OUTPATIENT)
Dept: URBAN - METROPOLITAN AREA CLINIC 113 | Facility: CLINIC | Age: 52
End: 2022-08-16
Payer: COMMERCIAL

## 2022-08-16 VITALS
SYSTOLIC BLOOD PRESSURE: 134 MMHG | BODY MASS INDEX: 37.17 KG/M2 | WEIGHT: 202 LBS | TEMPERATURE: 97.8 F | DIASTOLIC BLOOD PRESSURE: 92 MMHG | HEIGHT: 62 IN | HEART RATE: 99 BPM

## 2022-08-16 DIAGNOSIS — R11.0 CHRONIC NAUSEA: ICD-10-CM

## 2022-08-16 DIAGNOSIS — R10.84 GENERALIZED ABDOMINAL PAIN: ICD-10-CM

## 2022-08-16 DIAGNOSIS — K21.9 GASTROESOPHAGEAL REFLUX DISEASE, UNSPECIFIED WHETHER ESOPHAGITIS PRESENT: ICD-10-CM

## 2022-08-16 DIAGNOSIS — R10.11 RUQ ABDOMINAL PAIN: ICD-10-CM

## 2022-08-16 DIAGNOSIS — Z86.010 HISTORY OF ADENOMATOUS POLYP OF COLON: ICD-10-CM

## 2022-08-16 DIAGNOSIS — K58.0 IRRITABLE BOWEL SYNDROME WITH DIARRHEA: ICD-10-CM

## 2022-08-16 DIAGNOSIS — R74.8 ELEVATED LIVER ENZYMES: ICD-10-CM

## 2022-08-16 DIAGNOSIS — K58.2 IRRITABLE BOWEL SYNDROME WITH BOTH CONSTIPATION AND DIARRHEA: ICD-10-CM

## 2022-08-16 DIAGNOSIS — R79.89 ELEVATED LFTS: ICD-10-CM

## 2022-08-16 DIAGNOSIS — K76.0 FATTY LIVER: ICD-10-CM

## 2022-08-16 PROCEDURE — 99214 OFFICE O/P EST MOD 30 MIN: CPT

## 2022-08-16 RX ORDER — NORTRIPTYLINE HYDROCHLORIDE 25 MG/1
TAKE ONE CAPSULE BY MOUTH EVERY NIGHT AT BEDTIME CAPSULE ORAL
Qty: 30 | Refills: 0 | Status: ACTIVE | COMMUNITY
Start: 2018-05-09

## 2022-08-16 RX ORDER — GLUCOSAMINE/CHONDR SU A SOD 750-600 MG
1 CAPSULE TABLET ORAL ONCE A DAY
Status: ACTIVE | COMMUNITY

## 2022-08-16 RX ORDER — MECLIZINE HCL 25MG 25 MG/1
1 TABLET AS NEEDED TABLET, CHEWABLE ORAL ONCE A DAY
Status: ACTIVE | COMMUNITY

## 2022-08-16 RX ORDER — VERAPAMIL HYDROCHLORIDE 120 MG/1
1 TABLET TABLET ORAL ONCE A DAY
Status: ACTIVE | COMMUNITY

## 2022-08-16 RX ORDER — MILNACIPRAN HYDROCHLORIDE 25 MG/1
2 TABLETS TABLET, FILM COATED ORAL TWICE A DAY
Status: ACTIVE | COMMUNITY

## 2022-08-16 RX ORDER — DICYCLOMINE HYDROCHLORIDE 10 MG/1
TAKE ONE CAPSULE BY MOUTH EVERY 6 HOURS AS NEEDED FOR ABDOMINAL PAIN CAPSULE ORAL
Qty: 60 CAPSULE | Refills: 4 | Status: ACTIVE | COMMUNITY

## 2022-08-16 RX ORDER — BUSPIRONE HYDROCHLORIDE 5 MG/1
1 TABLET TABLET ORAL TWICE A DAY
Qty: 180 TABLET | Refills: 2 | Status: ACTIVE | COMMUNITY
Start: 2022-05-11

## 2022-08-16 RX ORDER — UBROGEPANT 100 MG/1
1 TABLET MAY TAKE SECOND DOSE AT LEAST 2 HOURS AFTER FIRST DOSE AS NEEDED TABLET ORAL ONCE A DAY
Status: ACTIVE | COMMUNITY

## 2022-08-16 RX ORDER — COLESEVELAM HYDROCHLORIDE 625 MG/1
TAKE 1 TABLET BY MOUTH TWICE DAILY TABLET, FILM COATED ORAL
Qty: 60 TABLET | Refills: 4 | Status: ACTIVE | COMMUNITY

## 2022-08-16 RX ORDER — LATANOPROST 50 UG/ML
1 DROP INTO AFFECTED EYE IN THE EVENING SOLUTION/ DROPS OPHTHALMIC ONCE A DAY
Status: ACTIVE | COMMUNITY

## 2022-08-16 RX ORDER — FUROSEMIDE 20 MG/1
1 TABLET TABLET ORAL ONCE A DAY
Status: ACTIVE | COMMUNITY

## 2022-08-16 RX ORDER — CLONAZEPAM 0.5 MG/1
TAKE ONE TABLET TWICE DAILY AS NEEDED TABLET ORAL
Qty: 60 | Refills: 0 | Status: ACTIVE | COMMUNITY
Start: 2018-10-25

## 2022-08-16 RX ORDER — CARVEDILOL 25 MG/1
1 TABLET WITH FOOD TABLET, FILM COATED ORAL TWICE A DAY
Status: ACTIVE | COMMUNITY

## 2022-08-16 RX ORDER — INSULIN DEGLUDEC INJECTION 100 U/ML
AS DIRECTED INJECTION, SOLUTION SUBCUTANEOUS
Status: ACTIVE | COMMUNITY

## 2022-08-16 RX ORDER — TRAZODONE HYDROCHLORIDE 150 MG/1
TAKE TWO TABLETS AT BEDTIME TABLET ORAL
Qty: 60 | Refills: 0 | Status: ACTIVE | COMMUNITY
Start: 2018-02-13

## 2022-08-16 RX ORDER — MONTELUKAST SODIUM 10 MG/1
TAKE ONE TABLET DAILY IN THE EVENING TABLET, FILM COATED ORAL
Qty: 30 | Refills: 0 | Status: ACTIVE | COMMUNITY
Start: 2018-10-24

## 2022-08-16 RX ORDER — PNV NO.95/FERROUS FUM/FOLIC AC 28MG-0.8MG
1 TABLET TABLET ORAL ONCE A DAY
Status: ACTIVE | COMMUNITY

## 2022-08-16 RX ORDER — ONDANSETRON 4 MG/1
1 TABLET ON THE TONGUE AND ALLOW TO DISSOLVE TABLET, ORALLY DISINTEGRATING ORAL
Qty: 40 | Refills: 0 | OUTPATIENT
Start: 2022-08-16

## 2022-08-16 RX ORDER — ALENDRONATE SODIUM 70 MG/1
TAKE ONE TABLET ONCE A WEEK TABLET ORAL
Qty: 4 | Refills: 0 | Status: ACTIVE | COMMUNITY
Start: 2018-09-11

## 2022-08-16 RX ORDER — RIMEGEPANT SULFATE 75 MG/75MG
1 TABLET ON THE TONGUE AND ALLOW TO DISSOLVE TABLET, ORALLY DISINTEGRATING ORAL
Status: ACTIVE | COMMUNITY

## 2022-08-16 RX ORDER — ALBUTEROL SULFATE 108 UG/1
1 PUFF AS NEEDED AEROSOL, METERED RESPIRATORY (INHALATION)
Status: ACTIVE | COMMUNITY

## 2022-08-16 RX ORDER — DICYCLOMINE HYDROCHLORIDE 20 MG/1
TAKE ONE CAPSULE BY MOUTH EVERY 6 HOURS AS NEEDED FOR ABDOMINAL PAIN TABLET ORAL
Qty: 90 | Refills: 1

## 2022-08-16 RX ORDER — DEXLANSOPRAZOLE 60 MG/1
TAKE ONE CAPSULE EVERY DAY CAPSULE, DELAYED RELEASE ORAL
Qty: 30 | Refills: 0 | Status: ACTIVE | COMMUNITY
Start: 2018-02-13

## 2022-08-16 RX ORDER — BUSPIRONE HYDROCHLORIDE 5 MG/1
1 TABLET TABLET ORAL TWICE A DAY
Qty: 180 TABLET | Refills: 2 | OUTPATIENT

## 2022-08-16 RX ORDER — DAPAGLIFLOZIN 5 MG/1
1 TABLET TABLET, FILM COATED ORAL ONCE A DAY
Status: ACTIVE | COMMUNITY

## 2022-08-16 NOTE — HPI-TODAY'S VISIT:
52-year-old female  with a history of diabetes, nausea, hyperlipidemia, sleep apnea, Sjogren's syndrome, hypothyroidism, blindness in the right eye, anxiety, depression, and liver enzyme elevation presents for follow-up after EGD.  She was last seen on 5/11/2022.  She reported continued intermittent abdominal pain associated with periods of increased anxiety.  She had tried and failed antispasmodics as well as taking fiber.  She was started on BuSpar 5 mg twice daily.  Given her continued right upper quadrant abdominal pain she was also planned for a CT scan of the abdomen/pelvis.  Regarding her GERD, she was to continue Dexilant 60 mg once daily.  She was to continue efforts at glycemic control, cholesterol control and weight control due to her fatty liver.  She was recommended low FODMAP diet for her IBS.  She is due for colon polyp surveillance in 2023.  CT scan abdomen/pelvis without contrast 5/23/2022:Questionable gastric wall thickening as discussed above.  Cystic splenic lesion and calcifications in the spleen suggesting granulomatous change.  Pancreas is partially fatty replaced.  Liver adrenals and kidneys are unremarkable.  There is bilateral perinephric stranding.  Stool and gas are seen throughout the colon to the level of the rectum.  EGD 8/4/2022:Normal esophagus.  Diffuse minimal inflammation characterized by congestion, erosions and erythema was found in the gastric body and antrum and biopsied.  Pathology showed foveolar hyperplasia with PPI effect, no evidence of H. pylori, intestinal metaplasia, dysplasia or malignancy.  Duodenum appeared normal however biopsies were taken to evaluate for celiac disease.  Pathology was unremarkable.   She states her appetite is poor. She has chronic nausea that has worsened. She has a four-year old which is difficult. The nausea does worsen with stress. She does admit to a stressful home life. She admits her sugars have been in the 500s in the past. She states her son has traumatic brain injury, and she is taking care of her grandson. She does not currently have nausea medication. Her bowels continue to alternate. She will have a bowel movement at least once daily however she does strain on occasion. She will have fecal urgency after eating anything.  e does not use Colesevelam. She does have some improvement in her generalized abdominal pain with Dicyclomine. She admits she has fibromyalgia. She was not aware of a low FODMAP diet or BuSpar medication.   5/11/22: 52-year-old female presenting for follow-up.  She was last seen in clinic in December 2021. We did try to get a CT scan last year but it was not approved. She has abdominal pain (generalized). She does have some constipation. Her pain is mostly RUQ/R flank. She has had increased abdominal pain when she is stressed.   12/2021 51-year-old female with a history of diabetes, hyperlipidemia, sleep apnea, Sjogren's syndrome, hypothyroidism, blindness in the right eye, anxiety/depression, liver enzyme elevation, presenting for follow-up.  She was last seen in December 2020 after a diagnostic colonoscopy was done to evaluate for colitis seen on CT without contrast. Colonoscopy was unremarkable, results outlined below.   She continues to have GI symptoms. A month ago, she started having new onset constipation. Stool softeners did not help. It lasted for 3 weeks. She is having a bowel movement daily, no melena or hematochezia. She  has bilateral lower abdominal pain for years, overall better but not resolved. It is sometimes better after a bowel movement. She had recent labs done by Dr. Eid.   10/10/2019: Ms. Duarte is a 49-year-old female with a history of diabetes, nausea, hyperlipidemia, sleep apnea, Sjogren's syndrome, hypothyroidism, blindness in the right, anxiety, depression, and liver enzyme elevation presenting for follow-up.  She was initially seen 6/24/19 referred for liver enzyme elevation. She reported elevated liver enzymes for 1-2 years. She denied alcohol use. She reported chronic GERD for which she was taking Dexilant. She had breakthrough heartburn twice a week and reported a few month history of solid food dysphagia. She reported chronic right upper quadrant pain. She reported nausea associated with migraines. She reported chronic alternating bowel habits. She had a colonoscopy 2 years prior during which polyps were removed and reported an EGD 10 years prior. Labs ordered to assess for sources of chronic liver disease, and she was scheduled for a CT to assess for sources of right upper quadrant pain and to assess liver parenchyma. She was scheduled for an EGD to assess breakthrough reflux and dysphagia. Daily fiber was recommended for chronic alternating bowel habits.  She reports unchanged pain in the right and left sides of her abdomen. She tried taking daily fiber without a change in bowel habits. She has a bowel movement 2-5 times per day. Her stools are occasionally hard and are occasionally loose. She denies red blood per rectum or melena. She is compliant with Dexilant for acid reflux. She reports uncontrolled blood glucose levels stating that her blood glucose levels are "good if they are not higher than 250."   6/24/2019: This is a 49-year-old female with a history of diabetes, hyperlipidemia, sleep apnea, Sjogren's syndrome, hypothyroidism, blindness in the right eye, anxiety/depression, liver enzyme elevation, rectal bleeding, elevated liver enzymes related to fatty liver, diarrhea, and a CT scan notable for colitis presenting for follow-up after a diagnostic colonoscopy.  She was last seen 11/20/2020.  She was complaining of worsening abdominal pain, intermittent diarrhea and constipation, rectal bleeding, and had a recent CT scan that demonstrated colitis.  She was scheduled for a colonoscopy and a stool for C. difficile was ordered.  Colonoscopy results below.  She reports persistent, multiple bowel movements per day.  Stool consistency ranges from solid to loose.  Stool frequency may be as often as 4 times a day.  She reports occasional urgent bowel movements and consistent urgency to defecate after eating at restaurants.  Her symptoms were worse on fiber.  She denies red blood per rectum or melena.  She has chronic pain indicating the right and left lateral abdomen occurring frequently.  Severity waxes and wanes.  Occasionally, this will improve after a bowel movement.  She reports some diet dependent exacerbations such as after eating spicy food.  Heartburn is controlled with Dexilant.  She has frequent, almost constant nausea that is worse with migraines.  She vomits once or twice a week producing bile.  She denies any other abdominal symptoms.  She has identified worsening diarrhea and abdominal symptoms associated with ingesting dairy products suggesting lactose intolerance.  She is on magnesium once a day for a history of magnesium deficiency.

## 2022-08-16 NOTE — PHYSICAL EXAM GASTROINTESTINAL
soft, marked tenderness to palpation of entire abdomen, mild guarding, nondistended , normal bowel sounds

## 2022-10-26 ENCOUNTER — OFFICE VISIT (OUTPATIENT)
Dept: URBAN - METROPOLITAN AREA CLINIC 113 | Facility: CLINIC | Age: 52
End: 2022-10-26
Payer: COMMERCIAL

## 2022-10-26 VITALS
RESPIRATION RATE: 96 BRPM | HEART RATE: 96 BPM | TEMPERATURE: 98 F | DIASTOLIC BLOOD PRESSURE: 78 MMHG | BODY MASS INDEX: 37.54 KG/M2 | HEIGHT: 62 IN | SYSTOLIC BLOOD PRESSURE: 128 MMHG | WEIGHT: 204 LBS

## 2022-10-26 DIAGNOSIS — R79.89 ELEVATED LFTS: ICD-10-CM

## 2022-10-26 DIAGNOSIS — K58.2 IRRITABLE BOWEL SYNDROME WITH BOTH CONSTIPATION AND DIARRHEA: ICD-10-CM

## 2022-10-26 DIAGNOSIS — R74.8 ELEVATED LIVER ENZYMES: ICD-10-CM

## 2022-10-26 DIAGNOSIS — R10.84 GENERALIZED ABDOMINAL PAIN: ICD-10-CM

## 2022-10-26 DIAGNOSIS — R10.11 RUQ ABDOMINAL PAIN: ICD-10-CM

## 2022-10-26 DIAGNOSIS — R11.0 CHRONIC NAUSEA: ICD-10-CM

## 2022-10-26 DIAGNOSIS — Z86.010 HISTORY OF ADENOMATOUS POLYP OF COLON: ICD-10-CM

## 2022-10-26 DIAGNOSIS — K21.9 GASTROESOPHAGEAL REFLUX DISEASE, UNSPECIFIED WHETHER ESOPHAGITIS PRESENT: ICD-10-CM

## 2022-10-26 DIAGNOSIS — K76.0 FATTY LIVER: ICD-10-CM

## 2022-10-26 DIAGNOSIS — K58.0 IRRITABLE BOWEL SYNDROME WITH DIARRHEA: ICD-10-CM

## 2022-10-26 PROCEDURE — 99214 OFFICE O/P EST MOD 30 MIN: CPT

## 2022-10-26 RX ORDER — DAPAGLIFLOZIN 5 MG/1
1 TABLET TABLET, FILM COATED ORAL ONCE A DAY
Status: ACTIVE | COMMUNITY

## 2022-10-26 RX ORDER — DICYCLOMINE HYDROCHLORIDE 20 MG/1
TAKE ONE CAPSULE BY MOUTH EVERY 6 HOURS AS NEEDED FOR ABDOMINAL PAIN TABLET ORAL
Qty: 90 | Refills: 1 | OUTPATIENT

## 2022-10-26 RX ORDER — MILNACIPRAN HYDROCHLORIDE 25 MG/1
2 TABLETS TABLET, FILM COATED ORAL TWICE A DAY
Status: ACTIVE | COMMUNITY

## 2022-10-26 RX ORDER — CARVEDILOL 25 MG/1
1 TABLET WITH FOOD TABLET, FILM COATED ORAL TWICE A DAY
Status: ACTIVE | COMMUNITY

## 2022-10-26 RX ORDER — PNV NO.95/FERROUS FUM/FOLIC AC 28MG-0.8MG
1 TABLET TABLET ORAL ONCE A DAY
Status: ACTIVE | COMMUNITY

## 2022-10-26 RX ORDER — UBROGEPANT 100 MG/1
1 TABLET MAY TAKE SECOND DOSE AT LEAST 2 HOURS AFTER FIRST DOSE AS NEEDED TABLET ORAL ONCE A DAY
Status: ACTIVE | COMMUNITY

## 2022-10-26 RX ORDER — ONDANSETRON 4 MG/1
1 TABLET ON THE TONGUE AND ALLOW TO DISSOLVE TABLET, ORALLY DISINTEGRATING ORAL
Qty: 40 | Refills: 0 | Status: ACTIVE | COMMUNITY
Start: 2022-08-16

## 2022-10-26 RX ORDER — MECLIZINE HCL 25MG 25 MG/1
1 TABLET AS NEEDED TABLET, CHEWABLE ORAL ONCE A DAY
Status: ACTIVE | COMMUNITY

## 2022-10-26 RX ORDER — FUROSEMIDE 20 MG/1
1 TABLET TABLET ORAL ONCE A DAY
Status: ACTIVE | COMMUNITY

## 2022-10-26 RX ORDER — INSULIN DEGLUDEC INJECTION 100 U/ML
AS DIRECTED INJECTION, SOLUTION SUBCUTANEOUS
Status: ACTIVE | COMMUNITY

## 2022-10-26 RX ORDER — VERAPAMIL HYDROCHLORIDE 120 MG/1
1 TABLET TABLET ORAL ONCE A DAY
Status: ACTIVE | COMMUNITY

## 2022-10-26 RX ORDER — ALENDRONATE SODIUM 70 MG/1
TAKE ONE TABLET ONCE A WEEK TABLET ORAL
Qty: 4 | Refills: 0 | Status: ACTIVE | COMMUNITY
Start: 2018-09-11

## 2022-10-26 RX ORDER — BUSPIRONE HYDROCHLORIDE 5 MG/1
1 TABLET TABLET ORAL TWICE A DAY
Qty: 180 TABLET | Refills: 2 | Status: ACTIVE | COMMUNITY

## 2022-10-26 RX ORDER — ALBUTEROL SULFATE 108 UG/1
1 PUFF AS NEEDED AEROSOL, METERED RESPIRATORY (INHALATION)
Status: ACTIVE | COMMUNITY

## 2022-10-26 RX ORDER — TRAZODONE HYDROCHLORIDE 150 MG/1
TAKE TWO TABLETS AT BEDTIME TABLET ORAL
Qty: 60 | Refills: 0 | Status: ACTIVE | COMMUNITY
Start: 2018-02-13

## 2022-10-26 RX ORDER — DEXLANSOPRAZOLE 60 MG/1
TAKE ONE CAPSULE EVERY DAY CAPSULE, DELAYED RELEASE ORAL
Qty: 30 | Refills: 0 | Status: ACTIVE | COMMUNITY
Start: 2018-02-13

## 2022-10-26 RX ORDER — COLESEVELAM HYDROCHLORIDE 625 MG/1
TAKE 1 TABLET BY MOUTH TWICE DAILY TABLET, FILM COATED ORAL
Qty: 60 TABLET | Refills: 4 | Status: ACTIVE | COMMUNITY

## 2022-10-26 RX ORDER — GLUCOSAMINE/CHONDR SU A SOD 750-600 MG
1 CAPSULE TABLET ORAL ONCE A DAY
Status: ACTIVE | COMMUNITY

## 2022-10-26 RX ORDER — NORTRIPTYLINE HYDROCHLORIDE 25 MG/1
TAKE ONE CAPSULE BY MOUTH EVERY NIGHT AT BEDTIME CAPSULE ORAL
Qty: 30 | Refills: 0 | Status: ACTIVE | COMMUNITY
Start: 2018-05-09

## 2022-10-26 RX ORDER — BUSPIRONE HYDROCHLORIDE 5 MG/1
1 TABLET TABLET ORAL TWICE A DAY
Qty: 180 TABLET | Refills: 2 | OUTPATIENT

## 2022-10-26 RX ORDER — CLONAZEPAM 0.5 MG/1
TAKE ONE TABLET TWICE DAILY AS NEEDED TABLET ORAL
Qty: 60 | Refills: 0 | Status: ACTIVE | COMMUNITY
Start: 2018-10-25

## 2022-10-26 RX ORDER — RIMEGEPANT SULFATE 75 MG/75MG
1 TABLET ON THE TONGUE AND ALLOW TO DISSOLVE TABLET, ORALLY DISINTEGRATING ORAL
Status: ACTIVE | COMMUNITY

## 2022-10-26 RX ORDER — MONTELUKAST SODIUM 10 MG/1
TAKE ONE TABLET DAILY IN THE EVENING TABLET, FILM COATED ORAL
Qty: 30 | Refills: 0 | Status: ACTIVE | COMMUNITY
Start: 2018-10-24

## 2022-10-26 RX ORDER — DICYCLOMINE HYDROCHLORIDE 20 MG/1
TAKE ONE CAPSULE BY MOUTH EVERY 6 HOURS AS NEEDED FOR ABDOMINAL PAIN TABLET ORAL
Qty: 90 | Refills: 1 | Status: ACTIVE | COMMUNITY

## 2022-10-26 RX ORDER — LATANOPROST 50 UG/ML
1 DROP INTO AFFECTED EYE IN THE EVENING SOLUTION/ DROPS OPHTHALMIC ONCE A DAY
Status: ACTIVE | COMMUNITY

## 2022-10-26 NOTE — HPI-TODAY'S VISIT:
52-year-old female presents for follow-up.  She was last seen on 8/16/2022.  She does have generalized abdominal pain possibly secondary to fibromyalgia.  She does have some relief with use of dicyclomine.  We would increase her dicyclomine to 20 mg as needed.  She does have chronic nausea which is also likely multifactorial.  Recent EGD was unremarkable.  Suspect functional dyspepsia versus uncontrolled diabetes.  She was unaware of BuSpar medication being sent to her pharmacy previously.  She was to start this 5 mg twice daily and provide Zofran for nausea relief.  She does have IBS with alternating bowel habits.  Reviewed a low FODMAP diet and recommended her to restart Benefiber 2 tablespoons daily.   She does have fewer episodes of abdominal pain using BuSpar. She admits to 50% improvement. Her diabetes remains uncontrolled. Her  has had two strokes, and she has custody of a four-year-old. This has been very stressful for her. She admits her bowels have markedly improved on daily fiber. Reflux is mostly controlled on Dexilant 60 mg once daily. She is overall happy with her improvement.  8/16/2022: 52-year-old female  with a history of diabetes, nausea, hyperlipidemia, sleep apnea, Sjogren's syndrome, hypothyroidism, blindness in the right eye, anxiety, depression, and liver enzyme elevation presents for follow-up after EGD.  She was last seen on 5/11/2022.  She reported continued intermittent abdominal pain associated with periods of increased anxiety.  She had tried and failed antispasmodics as well as taking fiber.  She was started on BuSpar 5 mg twice daily.  Given her continued right upper quadrant abdominal pain she was also planned for a CT scan of the abdomen/pelvis.  Regarding her GERD, she was to continue Dexilant 60 mg once daily.  She was to continue efforts at glycemic control, cholesterol control and weight control due to her fatty liver.  She was recommended low FODMAP diet for her IBS.  She is due for colon polyp surveillance in 2023.  She states her appetite is poor. She has chronic nausea that has worsened. She has a four-year old which is difficult. The nausea does worsen with stress. She does admit to a stressful home life. She admits her sugars have been in the 500s in the past. She states her son has traumatic brain injury, and she is taking care of her grandson. She does not currently have nausea medication. Her bowels continue to alternate. She will have a bowel movement at least once daily however she does strain on occasion. She will have fecal urgency after eating anything. Stuart ahmadi does not use Colesevelam. She does have some improvement in her generalized abdominal pain with Dicyclomine. She admits she has fibromyalgia. She was not aware of a low FODMAP diet or BuSpar medication.   5/11/22: 52-year-old female presenting for follow-up.  She was last seen in clinic in December 2021. We did try to get a CT scan last year but it was not approved. She has abdominal pain (generalized). She does have some constipation. Her pain is mostly RUQ/R flank. She has had increased abdominal pain when she is stressed.   12/2021 51-year-old female with a history of diabetes, hyperlipidemia, sleep apnea, Sjogren's syndrome, hypothyroidism, blindness in the right eye, anxiety/depression, liver enzyme elevation, presenting for follow-up.  She was last seen in December 2020 after a diagnostic colonoscopy was done to evaluate for colitis seen on CT without contrast. Colonoscopy was unremarkable, results outlined below.   She continues to have GI symptoms. A month ago, she started having new onset constipation. Stool softeners did not help. It lasted for 3 weeks. She is having a bowel movement daily, no melena or hematochezia. She  has bilateral lower abdominal pain for years, overall better but not resolved. It is sometimes better after a bowel movement. She had recent labs done by Dr. Eid.   10/10/2019: Ms. Duarte is a 49-year-old female with a history of diabetes, nausea, hyperlipidemia, sleep apnea, Sjogren's syndrome, hypothyroidism, blindness in the right, anxiety, depression, and liver enzyme elevation presenting for follow-up.  She was initially seen 6/24/19 referred for liver enzyme elevation. She reported elevated liver enzymes for 1-2 years. She denied alcohol use. She reported chronic GERD for which she was taking Dexilant. She had breakthrough heartburn twice a week and reported a few month history of solid food dysphagia. She reported chronic right upper quadrant pain. She reported nausea associated with migraines. She reported chronic alternating bowel habits. She had a colonoscopy 2 years prior during which polyps were removed and reported an EGD 10 years prior. Labs ordered to assess for sources of chronic liver disease, and she was scheduled for a CT to assess for sources of right upper quadrant pain and to assess liver parenchyma. She was scheduled for an EGD to assess breakthrough reflux and dysphagia. Daily fiber was recommended for chronic alternating bowel habits.  She reports unchanged pain in the right and left sides of her abdomen. She tried taking daily fiber without a change in bowel habits. She has a bowel movement 2-5 times per day. Her stools are occasionally hard and are occasionally loose. She denies red blood per rectum or melena. She is compliant with Dexilant for acid reflux. She reports uncontrolled blood glucose levels stating that her blood glucose levels are "good if they are not higher than 250."   6/24/2019: This is a 49-year-old female with a history of diabetes, hyperlipidemia, sleep apnea, Sjogren's syndrome, hypothyroidism, blindness in the right eye, anxiety/depression, liver enzyme elevation, rectal bleeding, elevated liver enzymes related to fatty liver, diarrhea, and a CT scan notable for colitis presenting for follow-up after a diagnostic colonoscopy.  She was last seen 11/20/2020.  She was complaining of worsening abdominal pain, intermittent diarrhea and constipation, rectal bleeding, and had a recent CT scan that demonstrated colitis.  She was scheduled for a colonoscopy and a stool for C. difficile was ordered.  Colonoscopy results below.  She reports persistent, multiple bowel movements per day.  Stool consistency ranges from solid to loose.  Stool frequency may be as often as 4 times a day.  She reports occasional urgent bowel movements and consistent urgency to defecate after eating at restaurants.  Her symptoms were worse on fiber.  She denies red blood per rectum or melena.  She has chronic pain indicating the right and left lateral abdomen occurring frequently.  Severity waxes and wanes.  Occasionally, this will improve after a bowel movement.  She reports some diet dependent exacerbations such as after eating spicy food.  Heartburn is controlled with Dexilant.  She has frequent, almost constant nausea that is worse with migraines.  She vomits once or twice a week producing bile.  She denies any other abdominal symptoms.  She has identified worsening diarrhea and abdominal symptoms associated with ingesting dairy products suggesting lactose intolerance.  She is on magnesium once a day for a history of magnesium deficiency.

## 2022-10-26 NOTE — HPI-OTHER HISTORIES
CT scan abdomen/pelvis without contrast 5/23/2022:Questionable gastric wall thickening as discussed above.  Cystic splenic lesion and calcifications in the spleen suggesting granulomatous change.  Pancreas is partially fatty replaced.  Liver adrenals and kidneys are unremarkable.  There is bilateral perinephric stranding.  Stool and gas are seen throughout the colon to the level of the rectum.  EGD 8/4/2022:Normal esophagus.  Diffuse minimal inflammation characterized by congestion, erosions and erythema was found in the gastric body and antrum and biopsied.  Pathology showed foveolar hyperplasia with PPI effect, no evidence of H. pylori, intestinal metaplasia, dysplasia or malignancy.  Duodenum appeared normal however biopsies were taken to evaluate for celiac disease.  Pathology was unremarkable.   Colonoscopy 11/24/2020: BBPS 9, grade 2 nonbleeding internal hemorrhoids, removal of an 8 mm rectal sessile hyperplastic polyp, removal of a 12 mm descending sessile tubular adenoma, normal mucosa in the entire colon status post biopsy.  Random biopsies demonstrated no significant abnormality.  Surveillance recommended in 2023.  CT of the abdomen and pelvis without contrast 10/20/2020 : Mild bowel wall thickening of the ascending and transverse colon is suspicious for a mild acute colitis.  Approximate 2.6 x 2.2 cm cyst within the superior spleen with peripheral calcification likely in the setting of prior trauma and/or splenic cyst.    Labs 9/22/2020: BMP normal with the exception of glucose 298, chloride 99.  LFTs: TB 0.8, , ALT 69, AST 66.  Cholesterol 286, triglycerides 233, .  Vitamin B12 321.  Hemoglobin A1c 9.9.  Vitamin D 25 hydroxy 23.  Folic acid 17.  Free T3 3.97.  CBC: WBC 6.2, hemoglobin 15.3, MCV 92.1, platelet 185.  TSH 1.07.  Free T4 1.0.  WALE negative.  EGD 8/13/19: No endoscopic abnormality to explain dysphagia status post biopsy, gastritis status post biopsy, normal examined duodenum. Antral biopsies notable for gastric mucosa showing reactive foveolar hyperplasia, stromal fibrosis, and focal mixed inflammation which can be seen in tissue adjacent to an erosion.  No H. pylori organisms identified.  GE junction biopsies notable for squamous mucosa with mild reactive epithelial changes.  No glandular epithelium identified.  Labs 7/9/19 ceruloplasmin 35. Hepatitis C antibody, hepatitis A antibody total, hepatitis B surface antibody, hepatitis B surface antigen nonreactive. Serum immunoglobulins: IgG 821, IgA 378, IgM 191, IgE 63. LKM1 negative. WALE, ASMA, WALE negative. Alpha-1 antitrypsin phenotype MM. CBC: WBC 7.3, hemoglobin 15.2, MCV 89.5, platelets 202. Iron 122, TIBC 351, ferritin of 132.  Colonoscopy 5/11/15:good prep, removal of a 6 mm transverse tubular adenoma, removal of a 3 mm descending tubular adenoma, nonbleeding internal hemorrhoids. Random biopsies demonstrated unremarkable colonic mucosa. Colonoscopy 11/24/2020: BBPS 9, grade 2 nonbleeding internal hemorrhoids, removal of an 8 mm rectal sessile hyperplastic polyp, removal of a 12 mm descending sessile tubular adenoma, normal mucosa in the entire colon status post biopsy.  Random biopsies demonstrated no significant abnormality.  Surveillance recommended in 2023.  CT of the abdomen and pelvis without contrast 10/20/2020 : Mild bowel wall thickening of the ascending and transverse colon is suspicious for a mild acute colitis.  Approximate 2.6 x 2.2 cm cyst within the superior spleen with peripheral calcification likely in the setting of prior trauma and/or splenic cyst.    Labs 9/22/2020: BMP normal with the exception of glucose 298, chloride 99.  LFTs: TB 0.8, , ALT 69, AST 66.  Cholesterol 286, triglycerides 233, .  Vitamin B12 321.  Hemoglobin A1c 9.9.  Vitamin D 25 hydroxy 23.  Folic acid 17.  Free T3 3.97.  CBC: WBC 6.2, hemoglobin 15.3, MCV 92.1, platelet 185.  TSH 1.07.  Free T4 1.0.  WALE negative.  EGD 8/13/19: No endoscopic abnormality to explain dysphagia status post biopsy, gastritis status post biopsy, normal examined duodenum. Antral biopsies notable for gastric mucosa showing reactive foveolar hyperplasia, stromal fibrosis, and focal mixed inflammation which can be seen in tissue adjacent to an erosion.  No H. pylori organisms identified.  GE junction biopsies notable for squamous mucosa with mild reactive epithelial changes.  No glandular epithelium identified.  Labs 7/9/19 ceruloplasmin 35. Hepatitis C antibody, hepatitis A antibody total, hepatitis B surface antibody, hepatitis B surface antigen nonreactive. Serum immunoglobulins: IgG 821, IgA 378, IgM 191, IgE 63. LKM1 negative. WALE, ASMA, WALE negative. Alpha-1 antitrypsin phenotype MM. CBC: WBC 7.3, hemoglobin 15.2, MCV 89.5, platelets 202. Iron 122, TIBC 351, ferritin of 132.  Colonoscopy 5/11/15:good prep, removal of a 6 mm transverse tubular adenoma, removal of a 3 mm descending tubular adenoma, nonbleeding internal hemorrhoids. Random biopsies demonstrated unremarkable colonic mucosa.

## 2022-11-08 ENCOUNTER — TELEPHONE ENCOUNTER (OUTPATIENT)
Dept: URBAN - METROPOLITAN AREA CLINIC 113 | Facility: CLINIC | Age: 52
End: 2022-11-08

## 2022-12-08 ENCOUNTER — TELEPHONE ENCOUNTER (OUTPATIENT)
Dept: URBAN - METROPOLITAN AREA CLINIC 92 | Facility: CLINIC | Age: 52
End: 2022-12-08

## 2022-12-08 ENCOUNTER — DASHBOARD ENCOUNTERS (OUTPATIENT)
Age: 52
End: 2022-12-08

## 2022-12-08 ENCOUNTER — OFFICE VISIT (OUTPATIENT)
Dept: URBAN - METROPOLITAN AREA CLINIC 113 | Facility: CLINIC | Age: 52
End: 2022-12-08
Payer: COMMERCIAL

## 2022-12-08 VITALS
BODY MASS INDEX: 37.91 KG/M2 | SYSTOLIC BLOOD PRESSURE: 161 MMHG | RESPIRATION RATE: 16 BRPM | HEART RATE: 113 BPM | DIASTOLIC BLOOD PRESSURE: 97 MMHG | TEMPERATURE: 97.8 F | WEIGHT: 206 LBS | HEIGHT: 62 IN

## 2022-12-08 DIAGNOSIS — R10.11 RUQ ABDOMINAL PAIN: ICD-10-CM

## 2022-12-08 DIAGNOSIS — R10.84 GENERALIZED ABDOMINAL PAIN: ICD-10-CM

## 2022-12-08 DIAGNOSIS — K22.2 ESOPHAGEAL STRICTURE: ICD-10-CM

## 2022-12-08 DIAGNOSIS — R79.89 ELEVATED LFTS: ICD-10-CM

## 2022-12-08 DIAGNOSIS — Z86.010 HISTORY OF ADENOMATOUS POLYP OF COLON: ICD-10-CM

## 2022-12-08 DIAGNOSIS — K58.2 IRRITABLE BOWEL SYNDROME WITH BOTH CONSTIPATION AND DIARRHEA: ICD-10-CM

## 2022-12-08 DIAGNOSIS — K76.0 FATTY LIVER: ICD-10-CM

## 2022-12-08 DIAGNOSIS — R74.8 ELEVATED LIVER ENZYMES: ICD-10-CM

## 2022-12-08 DIAGNOSIS — R11.0 CHRONIC NAUSEA: ICD-10-CM

## 2022-12-08 DIAGNOSIS — K21.9 GASTROESOPHAGEAL REFLUX DISEASE, UNSPECIFIED WHETHER ESOPHAGITIS PRESENT: ICD-10-CM

## 2022-12-08 DIAGNOSIS — K58.0 IRRITABLE BOWEL SYNDROME WITH DIARRHEA: ICD-10-CM

## 2022-12-08 PROBLEM — 10743008: Status: ACTIVE | Noted: 2021-12-15

## 2022-12-08 PROBLEM — 197321007: Status: ACTIVE | Noted: 2021-01-01

## 2022-12-08 PROBLEM — 197125005: Status: ACTIVE | Noted: 2020-12-29

## 2022-12-08 PROBLEM — 707724006: Status: ACTIVE | Noted: 2021-01-01

## 2022-12-08 PROBLEM — 429047008: Status: ACTIVE | Noted: 2020-12-29

## 2022-12-08 PROBLEM — 63305008: Status: ACTIVE | Noted: 2022-12-08

## 2022-12-08 PROCEDURE — 99213 OFFICE O/P EST LOW 20 MIN: CPT | Performed by: INTERNAL MEDICINE

## 2022-12-08 RX ORDER — DICYCLOMINE HYDROCHLORIDE 20 MG/1
TAKE ONE CAPSULE BY MOUTH EVERY 6 HOURS AS NEEDED FOR ABDOMINAL PAIN TABLET ORAL
Qty: 90 | Refills: 1 | Status: ACTIVE | COMMUNITY

## 2022-12-08 RX ORDER — BUSPIRONE HYDROCHLORIDE 5 MG/1
1 TABLET TABLET ORAL TWICE A DAY
Qty: 180 TABLET | Refills: 2 | OUTPATIENT

## 2022-12-08 RX ORDER — COLESEVELAM HYDROCHLORIDE 625 MG/1
TAKE 1 TABLET BY MOUTH TWICE DAILY TABLET, FILM COATED ORAL
Qty: 60 TABLET | Refills: 4 | Status: ACTIVE | COMMUNITY

## 2022-12-08 RX ORDER — RIMEGEPANT SULFATE 75 MG/75MG
1 TABLET ON THE TONGUE AND ALLOW TO DISSOLVE TABLET, ORALLY DISINTEGRATING ORAL
Status: ACTIVE | COMMUNITY

## 2022-12-08 RX ORDER — MONTELUKAST SODIUM 10 MG/1
TAKE ONE TABLET DAILY IN THE EVENING TABLET, FILM COATED ORAL
Qty: 30 | Refills: 0 | Status: ACTIVE | COMMUNITY
Start: 2018-10-24

## 2022-12-08 RX ORDER — VERAPAMIL HYDROCHLORIDE 120 MG/1
1 TABLET TABLET ORAL ONCE A DAY
Status: ACTIVE | COMMUNITY

## 2022-12-08 RX ORDER — PNV NO.95/FERROUS FUM/FOLIC AC 28MG-0.8MG
1 TABLET TABLET ORAL ONCE A DAY
Status: ACTIVE | COMMUNITY

## 2022-12-08 RX ORDER — DEXLANSOPRAZOLE 60 MG/1
TAKE ONE CAPSULE EVERY DAY CAPSULE, DELAYED RELEASE ORAL
Qty: 30 | Refills: 0 | Status: ACTIVE | COMMUNITY
Start: 2018-02-13

## 2022-12-08 RX ORDER — DICYCLOMINE HYDROCHLORIDE 20 MG/1
TAKE ONE CAPSULE BY MOUTH EVERY 6 HOURS AS NEEDED FOR ABDOMINAL PAIN TABLET ORAL
Qty: 90 | Refills: 1 | OUTPATIENT

## 2022-12-08 RX ORDER — FUROSEMIDE 20 MG/1
1 TABLET TABLET ORAL ONCE A DAY
Status: ACTIVE | COMMUNITY

## 2022-12-08 RX ORDER — BUSPIRONE HYDROCHLORIDE 5 MG/1
1 TABLET TABLET ORAL TWICE A DAY
Qty: 180 TABLET | Refills: 2 | Status: ACTIVE | COMMUNITY

## 2022-12-08 RX ORDER — CARVEDILOL 25 MG/1
1 TABLET WITH FOOD TABLET, FILM COATED ORAL TWICE A DAY
Status: ACTIVE | COMMUNITY

## 2022-12-08 RX ORDER — ONDANSETRON 4 MG/1
1 TABLET ON THE TONGUE AND ALLOW TO DISSOLVE TABLET, ORALLY DISINTEGRATING ORAL
Qty: 40 | Refills: 0 | Status: ACTIVE | COMMUNITY
Start: 2022-08-16

## 2022-12-08 RX ORDER — TRAZODONE HYDROCHLORIDE 150 MG/1
TAKE TWO TABLETS AT BEDTIME TABLET ORAL
Qty: 60 | Refills: 0 | Status: ACTIVE | COMMUNITY
Start: 2018-02-13

## 2022-12-08 RX ORDER — MECLIZINE HCL 25MG 25 MG/1
1 TABLET AS NEEDED TABLET, CHEWABLE ORAL ONCE A DAY
Status: ACTIVE | COMMUNITY

## 2022-12-08 RX ORDER — INSULIN DEGLUDEC INJECTION 100 U/ML
AS DIRECTED INJECTION, SOLUTION SUBCUTANEOUS
Status: ACTIVE | COMMUNITY

## 2022-12-08 RX ORDER — GLUCOSAMINE/CHONDR SU A SOD 750-600 MG
1 CAPSULE TABLET ORAL ONCE A DAY
Status: ACTIVE | COMMUNITY

## 2022-12-08 RX ORDER — DAPAGLIFLOZIN 5 MG/1
1 TABLET TABLET, FILM COATED ORAL ONCE A DAY
Status: ACTIVE | COMMUNITY

## 2022-12-08 RX ORDER — CLONAZEPAM 0.5 MG/1
TAKE ONE TABLET TWICE DAILY AS NEEDED TABLET ORAL
Qty: 60 | Refills: 0 | Status: ACTIVE | COMMUNITY
Start: 2018-10-25

## 2022-12-08 RX ORDER — MILNACIPRAN HYDROCHLORIDE 25 MG/1
2 TABLETS TABLET, FILM COATED ORAL TWICE A DAY
Status: ACTIVE | COMMUNITY

## 2022-12-08 RX ORDER — LATANOPROST 50 UG/ML
1 DROP INTO AFFECTED EYE IN THE EVENING SOLUTION/ DROPS OPHTHALMIC ONCE A DAY
Status: ACTIVE | COMMUNITY

## 2022-12-08 RX ORDER — NORTRIPTYLINE HYDROCHLORIDE 25 MG/1
TAKE ONE CAPSULE BY MOUTH EVERY NIGHT AT BEDTIME CAPSULE ORAL
Qty: 30 | Refills: 0 | Status: ACTIVE | COMMUNITY
Start: 2018-05-09

## 2022-12-08 RX ORDER — ALENDRONATE SODIUM 70 MG/1
TAKE ONE TABLET ONCE A WEEK TABLET ORAL
Qty: 4 | Refills: 0 | Status: ACTIVE | COMMUNITY
Start: 2018-09-11

## 2022-12-08 RX ORDER — UBROGEPANT 100 MG/1
1 TABLET MAY TAKE SECOND DOSE AT LEAST 2 HOURS AFTER FIRST DOSE AS NEEDED TABLET ORAL ONCE A DAY
Status: ACTIVE | COMMUNITY

## 2022-12-08 RX ORDER — ALBUTEROL SULFATE 108 UG/1
1 PUFF AS NEEDED AEROSOL, METERED RESPIRATORY (INHALATION)
Status: ACTIVE | COMMUNITY

## 2022-12-08 NOTE — PHYSICAL EXAM EYES:
Conjuntivae and eyelids appear normal , Sclerae : White without injection, no icterus. , Conjuntivae and eyelids appear normal , Sclerae : White without injection, no icterus. , Conjuntivae and eyelids appear normal , Sclerae : White without injection, no icterus.

## 2022-12-08 NOTE — PHYSICAL EXAM LUNGS:
no increased work of breathing or signs of respiratory distress, clear to auscultation bilaterally  , no increased work of breathing or signs of respiratory distress, clear to auscultation bilaterally  , no increased work of breathing or signs of respiratory distress, clear to auscultation bilaterally

## 2022-12-08 NOTE — PHYSICAL EXAM NEUROLOGIC:
oriented to person, place and time , normal mood with an appropriate affect , oriented to person, place and time , normal mood with an appropriate affect , oriented to person, place and time , normal mood with an appropriate affect

## 2022-12-08 NOTE — HPI-TODAY'S VISIT:
52-year-old female presenting for follow-up.  She was last seen in October 2022. She did have a swallow study done and was found to have an esophageal stricture. EGD in August 2022 did not show a stricture.   10/26/22 52-year-old female presents for follow-up.  She was last seen on 8/16/2022.  She does have generalized abdominal pain possibly secondary to fibromyalgia.  She does have some relief with use of dicyclomine.  We would increase her dicyclomine to 20 mg as needed.  She does have chronic nausea which is also likely multifactorial.  Recent EGD was unremarkable.  Suspect functional dyspepsia versus uncontrolled diabetes.  She was unaware of BuSpar medication being sent to her pharmacy previously.  She was to start this 5 mg twice daily and provide Zofran for nausea relief.  She does have IBS with alternating bowel habits.  Reviewed a low FODMAP diet and recommended her to restart Benefiber 2 tablespoons daily.   She does have fewer episodes of abdominal pain using BuSpar. She admits to 50% improvement. Her diabetes remains uncontrolled. Her  has had two strokes, and she has custody of a four-year-old. This has been very stressful for her. She admits her bowels have markedly improved on daily fiber. Reflux is mostly controlled on Dexilant 60 mg once daily. She is overall happy with her improvement.  8/16/2022: 52-year-old female  with a history of diabetes, nausea, hyperlipidemia, sleep apnea, Sjogren's syndrome, hypothyroidism, blindness in the right eye, anxiety, depression, and liver enzyme elevation presents for follow-up after EGD.  She was last seen on 5/11/2022.  She reported continued intermittent abdominal pain associated with periods of increased anxiety.  She had tried and failed antispasmodics as well as taking fiber.  She was started on BuSpar 5 mg twice daily.  Given her continued right upper quadrant abdominal pain she was also planned for a CT scan of the abdomen/pelvis.  Regarding her GERD, she was to continue Dexilant 60 mg once daily.  She was to continue efforts at glycemic control, cholesterol control and weight control due to her fatty liver.  She was recommended low FODMAP diet for her IBS.  She is due for colon polyp surveillance in 2023.  She states her appetite is poor. She has chronic nausea that has worsened. She has a four-year old which is difficult. The nausea does worsen with stress. She does admit to a stressful home life. She admits her sugars have been in the 500s in the past. She states her son has traumatic brain injury, and she is taking care of her grandson. She does not currently have nausea medication. Her bowels continue to alternate. She will have a bowel movement at least once daily however she does strain on occasion. She will have fecal urgency after eating anything. Sh e does not use Colesevelam. She does have some improvement in her generalized abdominal pain with Dicyclomine. She admits she has fibromyalgia. She was not aware of a low FODMAP diet or BuSpar medication.   5/11/22: 52-year-old female presenting for follow-up.  She was last seen in clinic in December 2021. We did try to get a CT scan last year but it was not approved. She has abdominal pain (generalized). She does have some constipation. Her pain is mostly RUQ/R flank. She has had increased abdominal pain when she is stressed.   12/2021 51-year-old female with a history of diabetes, hyperlipidemia, sleep apnea, Sjogren's syndrome, hypothyroidism, blindness in the right eye, anxiety/depression, liver enzyme elevation, presenting for follow-up.  She was last seen in December 2020 after a diagnostic colonoscopy was done to evaluate for colitis seen on CT without contrast. Colonoscopy was unremarkable, results outlined below.   She continues to have GI symptoms. A month ago, she started having new onset constipation. Stool softeners did not help. It lasted for 3 weeks. She is having a bowel movement daily, no melena or hematochezia. She  has bilateral lower abdominal pain for years, overall better but not resolved. It is sometimes better after a bowel movement. She had recent labs done by Dr. Eid.   10/10/2019: Ms. Duarte is a 49-year-old female with a history of diabetes, nausea, hyperlipidemia, sleep apnea, Sjogren's syndrome, hypothyroidism, blindness in the right, anxiety, depression, and liver enzyme elevation presenting for follow-up.  She was initially seen 6/24/19 referred for liver enzyme elevation. She reported elevated liver enzymes for 1-2 years. She denied alcohol use. She reported chronic GERD for which she was taking Dexilant. She had breakthrough heartburn twice a week and reported a few month history of solid food dysphagia. She reported chronic right upper quadrant pain. She reported nausea associated with migraines. She reported chronic alternating bowel habits. She had a colonoscopy 2 years prior during which polyps were removed and reported an EGD 10 years prior. Labs ordered to assess for sources of chronic liver disease, and she was scheduled for a CT to assess for sources of right upper quadrant pain and to assess liver parenchyma. She was scheduled for an EGD to assess breakthrough reflux and dysphagia. Daily fiber was recommended for chronic alternating bowel habits.  She reports unchanged pain in the right and left sides of her abdomen. She tried taking daily fiber without a change in bowel habits. She has a bowel movement 2-5 times per day. Her stools are occasionally hard and are occasionally loose. She denies red blood per rectum or melena. She is compliant with Dexilant for acid reflux. She reports uncontrolled blood glucose levels stating that her blood glucose levels are "good if they are not higher than 250."   6/24/2019: This is a 49-year-old female with a history of diabetes, hyperlipidemia, sleep apnea, Sjogren's syndrome, hypothyroidism, blindness in the right eye, anxiety/depression, liver enzyme elevation, rectal bleeding, elevated liver enzymes related to fatty liver, diarrhea, and a CT scan notable for colitis presenting for follow-up after a diagnostic colonoscopy.  She was last seen 11/20/2020.  She was complaining of worsening abdominal pain, intermittent diarrhea and constipation, rectal bleeding, and had a recent CT scan that demonstrated colitis.  She was scheduled for a colonoscopy and a stool for C. difficile was ordered.  Colonoscopy results below.  She reports persistent, multiple bowel movements per day.  Stool consistency ranges from solid to loose.  Stool frequency may be as often as 4 times a day.  She reports occasional urgent bowel movements and consistent urgency to defecate after eating at restaurants.  Her symptoms were worse on fiber.  She denies red blood per rectum or melena.  She has chronic pain indicating the right and left lateral abdomen occurring frequently.  Severity waxes and wanes.  Occasionally, this will improve after a bowel movement.  She reports some diet dependent exacerbations such as after eating spicy food.  Heartburn is controlled with Dexilant.  She has frequent, almost constant nausea that is worse with migraines.  She vomits once or twice a week producing bile.  She denies any other abdominal symptoms.  She has identified worsening diarrhea and abdominal symptoms associated with ingesting dairy products suggesting lactose intolerance.  She is on magnesium once a day for a history of magnesium deficiency.

## 2022-12-08 NOTE — PHYSICAL EXAM CONSTITUTIONAL:
alert ,  pleasant, well nourished, in no acute distress , alert ,  pleasant, well nourished, in no acute distress , alert ,  pleasant, well nourished, in no acute distress

## 2022-12-08 NOTE — PHYSICAL EXAM CARDIOVASCULAR:
regular rate and rhythm , S1, S2 normal ,  , no murmurs, rubs, gallops , no edema , regular rate and rhythm , S1, S2 normal ,  , no murmurs, rubs, gallops , no edema , regular rate and rhythm , S1, S2 normal ,  , no murmurs, rubs, gallops , no edema

## 2022-12-08 NOTE — PHYSICAL EXAM GASTROINTESTINAL
soft, nontender, nondistended , normal bowel sounds , soft, nontender, nondistended , normal bowel sounds , soft, nontender, nondistended , normal bowel sounds

## 2022-12-12 ENCOUNTER — ERX REFILL RESPONSE (OUTPATIENT)
Dept: URBAN - METROPOLITAN AREA CLINIC 113 | Facility: CLINIC | Age: 52
End: 2022-12-12

## 2022-12-12 RX ORDER — DICYCLOMINE HYDROCHLORIDE 20 MG/1
1 CAPSULE TABLET ORAL
Qty: 270 | Refills: 1 | OUTPATIENT

## 2022-12-12 RX ORDER — DICYCLOMINE HYDROCHLORIDE 20 MG/1
TAKE ONE CAPSULE BY MOUTH EVERY 6 HOURS AS NEEDED FOR ABDOMINAL PAIN TABLET ORAL
Qty: 90 | Refills: 1 | OUTPATIENT

## 2022-12-27 ENCOUNTER — OFFICE VISIT (OUTPATIENT)
Dept: URBAN - METROPOLITAN AREA SURGERY CENTER 25 | Facility: SURGERY CENTER | Age: 52
End: 2022-12-27

## 2022-12-30 PROBLEM — 235595009: Status: ACTIVE | Noted: 2021-01-01

## 2023-01-05 ENCOUNTER — OFFICE VISIT (OUTPATIENT)
Dept: URBAN - METROPOLITAN AREA SURGERY CENTER 25 | Facility: SURGERY CENTER | Age: 53
End: 2023-01-05

## 2023-01-18 ENCOUNTER — OFFICE VISIT (OUTPATIENT)
Dept: URBAN - METROPOLITAN AREA MEDICAL CENTER 19 | Facility: MEDICAL CENTER | Age: 53
End: 2023-01-18

## 2023-01-25 ENCOUNTER — OFFICE VISIT (OUTPATIENT)
Dept: URBAN - METROPOLITAN AREA SURGERY CENTER 25 | Facility: SURGERY CENTER | Age: 53
End: 2023-01-25

## 2023-02-01 ENCOUNTER — OFFICE VISIT (OUTPATIENT)
Dept: URBAN - METROPOLITAN AREA CLINIC 113 | Facility: CLINIC | Age: 53
End: 2023-02-01

## 2023-03-15 ENCOUNTER — OFFICE VISIT (OUTPATIENT)
Dept: URBAN - METROPOLITAN AREA CLINIC 113 | Facility: CLINIC | Age: 53
End: 2023-03-15

## 2023-04-03 ENCOUNTER — OFFICE VISIT (OUTPATIENT)
Dept: URBAN - METROPOLITAN AREA CLINIC 113 | Facility: CLINIC | Age: 53
End: 2023-04-03

## 2023-04-07 ENCOUNTER — ERX REFILL RESPONSE (OUTPATIENT)
Dept: URBAN - METROPOLITAN AREA CLINIC 113 | Facility: CLINIC | Age: 53
End: 2023-04-07

## 2023-04-07 RX ORDER — DICYCLOMINE HYDROCHLORIDE 20 MG/1
1 CAPSULE TABLET ORAL
Qty: 270 | Refills: 1 | OUTPATIENT

## 2023-04-10 ENCOUNTER — OFFICE VISIT (OUTPATIENT)
Dept: URBAN - METROPOLITAN AREA CLINIC 113 | Facility: CLINIC | Age: 53
End: 2023-04-10

## 2023-04-13 ENCOUNTER — OFFICE VISIT (OUTPATIENT)
Dept: URBAN - METROPOLITAN AREA SURGERY CENTER 25 | Facility: SURGERY CENTER | Age: 53
End: 2023-04-13